# Patient Record
Sex: FEMALE | Race: BLACK OR AFRICAN AMERICAN | Employment: OTHER | URBAN - METROPOLITAN AREA
[De-identification: names, ages, dates, MRNs, and addresses within clinical notes are randomized per-mention and may not be internally consistent; named-entity substitution may affect disease eponyms.]

---

## 2017-01-03 RX ORDER — POTASSIUM CHLORIDE 750 MG/1
TABLET, EXTENDED RELEASE ORAL
Qty: 180 TAB | Refills: 0 | Status: ON HOLD | OUTPATIENT
Start: 2017-01-03 | End: 2018-07-02

## 2017-07-10 ENCOUNTER — HOSPITAL ENCOUNTER (EMERGENCY)
Age: 82
Discharge: HOME | End: 2017-07-10
Attending: EMERGENCY MEDICINE
Payer: MEDICARE

## 2017-07-10 VITALS
SYSTOLIC BLOOD PRESSURE: 145 MMHG | WEIGHT: 109 LBS | HEART RATE: 91 BPM | DIASTOLIC BLOOD PRESSURE: 72 MMHG | HEIGHT: 61 IN | OXYGEN SATURATION: 99 % | BODY MASS INDEX: 20.58 KG/M2 | TEMPERATURE: 98.2 F | RESPIRATION RATE: 18 BRPM

## 2017-07-10 DIAGNOSIS — L03.115 CELLULITIS OF RIGHT LOWER EXTREMITY: Primary | ICD-10-CM

## 2017-07-10 DIAGNOSIS — R60.9 EDEMA, UNSPECIFIED TYPE: ICD-10-CM

## 2017-07-10 LAB
ALBUMIN SERPL BCP-MCNC: 3.7 G/DL (ref 3.4–5)
ALBUMIN/GLOB SERPL: 0.8 {RATIO} (ref 0.8–1.7)
ALP SERPL-CCNC: 87 U/L (ref 45–117)
ALT SERPL-CCNC: 27 U/L (ref 13–56)
ANION GAP BLD CALC-SCNC: 9 MMOL/L (ref 3–18)
AST SERPL W P-5'-P-CCNC: 20 U/L (ref 15–37)
BASOPHILS # BLD AUTO: 0 K/UL (ref 0–0.1)
BASOPHILS # BLD: 0 % (ref 0–2)
BILIRUB SERPL-MCNC: 0.6 MG/DL (ref 0.2–1)
BUN SERPL-MCNC: 15 MG/DL (ref 7–18)
BUN/CREAT SERPL: 21 (ref 12–20)
CALCIUM SERPL-MCNC: 9.1 MG/DL (ref 8.5–10.1)
CHLORIDE SERPL-SCNC: 106 MMOL/L (ref 100–108)
CO2 SERPL-SCNC: 26 MMOL/L (ref 21–32)
CREAT SERPL-MCNC: 0.73 MG/DL (ref 0.6–1.3)
DIFFERENTIAL METHOD BLD: ABNORMAL
EOSINOPHIL # BLD: 0.1 K/UL (ref 0–0.4)
EOSINOPHIL NFR BLD: 1 % (ref 0–5)
ERYTHROCYTE [DISTWIDTH] IN BLOOD BY AUTOMATED COUNT: 15 % (ref 11.6–14.5)
GLOBULIN SER CALC-MCNC: 4.7 G/DL (ref 2–4)
GLUCOSE SERPL-MCNC: 93 MG/DL (ref 74–99)
HCT VFR BLD AUTO: 36.8 % (ref 35–45)
HGB BLD-MCNC: 12.4 G/DL (ref 12–16)
LYMPHOCYTES # BLD AUTO: 20 % (ref 21–52)
LYMPHOCYTES # BLD: 1.3 K/UL (ref 0.9–3.6)
MCH RBC QN AUTO: 28.4 PG (ref 24–34)
MCHC RBC AUTO-ENTMCNC: 33.7 G/DL (ref 31–37)
MCV RBC AUTO: 84.2 FL (ref 74–97)
MONOCYTES # BLD: 0.9 K/UL (ref 0.05–1.2)
MONOCYTES NFR BLD AUTO: 14 % (ref 3–10)
NEUTS SEG # BLD: 4.1 K/UL (ref 1.8–8)
NEUTS SEG NFR BLD AUTO: 65 % (ref 40–73)
PLATELET # BLD AUTO: 123 K/UL (ref 135–420)
PMV BLD AUTO: 11.3 FL (ref 9.2–11.8)
POTASSIUM SERPL-SCNC: 3.1 MMOL/L (ref 3.5–5.5)
PROT SERPL-MCNC: 8.4 G/DL (ref 6.4–8.2)
RBC # BLD AUTO: 4.37 M/UL (ref 4.2–5.3)
SODIUM SERPL-SCNC: 141 MMOL/L (ref 136–145)
WBC # BLD AUTO: 6.4 K/UL (ref 4.6–13.2)

## 2017-07-10 PROCEDURE — 80053 COMPREHEN METABOLIC PANEL: CPT | Performed by: EMERGENCY MEDICINE

## 2017-07-10 PROCEDURE — 85025 COMPLETE CBC W/AUTO DIFF WBC: CPT | Performed by: EMERGENCY MEDICINE

## 2017-07-10 PROCEDURE — 99282 EMERGENCY DEPT VISIT SF MDM: CPT

## 2017-07-10 PROCEDURE — 93971 EXTREMITY STUDY: CPT

## 2017-07-10 RX ORDER — CLINDAMYCIN HYDROCHLORIDE 300 MG/1
300 CAPSULE ORAL 3 TIMES DAILY
Qty: 21 CAP | Refills: 0 | Status: SHIPPED | OUTPATIENT
Start: 2017-07-10 | End: 2017-07-17

## 2017-07-10 NOTE — PROCEDURES
[facility]  *** FINAL REPORT ***    Name: Martin Maloney  MRN: [id number]  : 10 Apr 1927  HIS Order #: [id number]  [id number] [facility] Visit #: [id number]  Date: 10 Jul 2017    TYPE OF TEST: Peripheral Venous Testing    REASON FOR TEST  Pain in limb, Limb swelling    Right Leg:-  Deep venous thrombosis:           No  Superficial venous thrombosis:    No  Deep venous insufficiency:        Not examined  Superficial venous insufficiency: Not examined      INTERPRETATION/FINDINGS  Right leg :  Duplex images were obtained using 2-D gray scale, color flow, and  spectral Doppler analysis. 1. No evidence of deep venous thrombosis detected in the veins  visualized. 2. Deep veins visualized include the common femoral, femoral,  popliteal, posterior tibial and peroneal veins. 3. No evidence of superficial thrombosis detected. 4. Superficial veins visualized include the proximal great saphenous  vein. 5. Posterior tibial and anterior tibial arteries are both patent with  normal multiphasic flow. 6. No evidence of deep vein thrombosis in the contralateral common  femoral vein. ADDITIONAL COMMENTS    I have personally reviewed the data relevant to the interpretation of  this  study. TECHNOLOGIST: Deepa Bautista RVT  Signed: 07/10/2017 01:54 PM    PHYSICIAN: Edel Edwards D.O.   Signed: 07/10/2017 10:57 PM

## 2017-07-10 NOTE — ED PROVIDER NOTES
HPI Comments: 12:34 PM Portia Green is a 80 y.o. female with a h/o HTN, Hypothyroidism, DDD, Osteopenia, DJD, and Scoliosis, presents to the ED in the care of her daughter with complaints of right leg discoloration that has been gradually getting worse within the past couple months. However, she denies any leg pain, fever, nausea, vomiting, diarrhea, cough, SOB, chest pain, and/or rash. She denies recently traveling or any tobacco use. No further symptoms or complaints expressed at this time. Patient is a 80 y.o. female presenting with lower extremity edema. The history is provided by the patient and a relative. Leg Swelling   Pertinent negatives include no chest pain, no abdominal pain and no shortness of breath.         Past Medical History:   Diagnosis Date    DDD (degenerative disc disease), cervical 1-28-02    Diverticulosis 7-99    by BE    DJD (degenerative joint disease) of knee 12-29-03    right knee    Fatigue 5-14-04 5-7-04 TSH =1.7, 12-29-06 TSH=2.67, 5-2008 TSH=1.3    Glaucoma 12-99    cataract- s/p left CATS w/ IOL    Hypercholesterolemia     (LDL)    Hypertension     Hypothyroidism     post surg    Osteopenia 2-15-02    SPINE & HIP BY DXA,      VIT D =31 ()    Sacroiliac joint pain 9/2014    Scoliosis 9/2014    Shingles     left forehead    Vitamin D deficiency 8/19/2010       Past Surgical History:   Procedure Laterality Date    HX MOHS PROCEDURES  9-2004    LEFT    OR COLONOSCOPY FLX DX W/COLLJ SPEC WHEN PFRMD           Family History:   Problem Relation Age of Onset    Breast Cancer Mother     Hypertension Mother     Heart Attack Father     Hypertension Sister     Hypertension Brother     Stroke Brother     Hypertension Sister     Hypertension Brother     Heart Attack Brother     Breast Cancer Sister     Hypertension Sister     Thyroid Disease Daughter        Social History     Social History    Marital status:  Spouse name: N/A    Number of children: N/A    Years of education: N/A     Occupational History    Not on file. Social History Main Topics    Smoking status: Never Smoker    Smokeless tobacco: Never Used    Alcohol use No    Drug use: No    Sexual activity: Not on file     Other Topics Concern    Not on file     Social History Narrative         ALLERGIES: Review of patient's allergies indicates no known allergies. Review of Systems   Constitutional: Negative for chills, fatigue, fever and unexpected weight change. HENT: Negative for congestion and rhinorrhea. Respiratory: Negative for cough and shortness of breath. Cardiovascular: Negative for chest pain, palpitations and leg swelling. Gastrointestinal: Negative for abdominal pain, diarrhea, nausea and vomiting. Genitourinary: Negative for dysuria. Musculoskeletal: Negative for back pain and myalgias. Skin: Positive for color change (right leg). Negative for rash. Neurological: Negative for dizziness and weakness. Psychiatric/Behavioral: The patient is not nervous/anxious. All other systems reviewed and are negative. Vitals:    07/10/17 1050   BP: 145/72   Pulse: 91   Resp: 18   Temp: 98.2 °F (36.8 °C)   SpO2: 99%   Weight: 49.4 kg (109 lb)   Height: 5' 1\" (1.549 m)            Physical Exam   Constitutional: She is oriented to person, place, and time. She appears well-developed and well-nourished. No distress. HENT:   Head: Normocephalic and atraumatic. Right Ear: External ear normal.   Left Ear: External ear normal.   Nose: Nose normal.   Mouth/Throat: Oropharynx is clear and moist.   Eyes: Conjunctivae and EOM are normal. Pupils are equal, round, and reactive to light. No scleral icterus. Neck: Normal range of motion. Neck supple. No JVD present. No tracheal deviation present. No thyromegaly present. Cardiovascular: Normal rate, regular rhythm, normal heart sounds and intact distal pulses.   Exam reveals no gallop and no friction rub. No murmur heard. Pulmonary/Chest: Effort normal and breath sounds normal. She exhibits no tenderness. Abdominal: Soft. Bowel sounds are normal. She exhibits no distension. There is no tenderness. There is no rebound and no guarding. Musculoskeletal: Normal range of motion. She exhibits tenderness. She exhibits no edema. R LE with lateral nonblanching redness to the dorsum of the foot that stays below the knee, doppler flow noted on PT and DP could not be found, L LE with biphasic doppler flow, no redness, full strength in R LE    Lymphadenopathy:     She has no cervical adenopathy. Neurological: She is alert and oriented to person, place, and time. No cranial nerve deficit. Coordination normal.   No sensory loss, Gait normal, Motor 5/5   Skin: Skin is warm and dry. Psychiatric: She has a normal mood and affect. Her behavior is normal. Judgment and thought content normal.   Nursing note and vitals reviewed. MDM  Number of Diagnoses or Management Options  Diagnosis management comments: Pt is a 79 yo female with a hx of DJD, HTN presents with discoloration of the RLE with pain for the past several months with gradual onset. Pt daughter notes her leg was seen yesterday and had changed significantly with swelling and redness. Suspect cellulitis vs DVT vs ischemia. Will follow vascular studies then reevaluate.  Christa Cool DO 1:07 PM      ED Course       Procedures               Vitals:  Patient Vitals for the past 12 hrs:   Temp Pulse Resp BP SpO2   07/10/17 1050 98.2 °F (36.8 °C) 91 18 145/72 99 %       Medications ordered:   Medications - No data to display      Lab findings:  Recent Results (from the past 12 hour(s))   CBC WITH AUTOMATED DIFF    Collection Time: 07/10/17 11:39 AM   Result Value Ref Range    WBC 6.4 4.6 - 13.2 K/uL    RBC 4.37 4.20 - 5.30 M/uL    HGB 12.4 12.0 - 16.0 g/dL    HCT 36.8 35.0 - 45.0 %    MCV 84.2 74.0 - 97.0 FL    MCH 28.4 24.0 - 34.0 PG MCHC 33.7 31.0 - 37.0 g/dL    RDW 15.0 (H) 11.6 - 14.5 %    PLATELET 831 (L) 928 - 420 K/uL    MPV 11.3 9.2 - 11.8 FL    NEUTROPHILS 65 40 - 73 %    LYMPHOCYTES 20 (L) 21 - 52 %    MONOCYTES 14 (H) 3 - 10 %    EOSINOPHILS 1 0 - 5 %    BASOPHILS 0 0 - 2 %    ABS. NEUTROPHILS 4.1 1.8 - 8.0 K/UL    ABS. LYMPHOCYTES 1.3 0.9 - 3.6 K/UL    ABS. MONOCYTES 0.9 0.05 - 1.2 K/UL    ABS. EOSINOPHILS 0.1 0.0 - 0.4 K/UL    ABS. BASOPHILS 0.0 0.0 - 0.1 K/UL    DF AUTOMATED     METABOLIC PANEL, COMPREHENSIVE    Collection Time: 07/10/17 11:39 AM   Result Value Ref Range    Sodium 141 136 - 145 mmol/L    Potassium 3.1 (L) 3.5 - 5.5 mmol/L    Chloride 106 100 - 108 mmol/L    CO2 26 21 - 32 mmol/L    Anion gap 9 3.0 - 18 mmol/L    Glucose 93 74 - 99 mg/dL    BUN 15 7.0 - 18 MG/DL    Creatinine 0.73 0.6 - 1.3 MG/DL    BUN/Creatinine ratio 21 (H) 12 - 20      GFR est AA >60 >60 ml/min/1.73m2    GFR est non-AA >60 >60 ml/min/1.73m2    Calcium 9.1 8.5 - 10.1 MG/DL    Bilirubin, total 0.6 0.2 - 1.0 MG/DL    ALT (SGPT) 27 13 - 56 U/L    AST (SGOT) 20 15 - 37 U/L    Alk. phosphatase 87 45 - 117 U/L    Protein, total 8.4 (H) 6.4 - 8.2 g/dL    Albumin 3.7 3.4 - 5.0 g/dL    Globulin 4.7 (H) 2.0 - 4.0 g/dL    A-G Ratio 0.8 0.8 - 1.7           X-Ray, CT or other radiology findings or impressions:  DUPLEX LOWER EXT VENOUS RIGHT             Progress notes, Consult notes or additional Procedure notes:   Pt Duplex notes no DVT and good arterial flow so no need to have formal arterial studies. Will treat as cellulitis and proceed with close outpatient care. Massiel Aquino DO 2:44 PM    Reevaluation of patient:   I have reassessed the patient. Patient is feeling reassured. Disposition:  Diagnosis:   1. Cellulitis of right lower extremity    2.  Edema, unspecified type        Disposition: DC     Follow-up Information     Follow up With Details Comments Contact Info    Isaías Francis MD In 2 days  [address] [place] [address] [address]      [facility] EMERGENCY DEPT  As needed, If symptoms worsen [address] [id number]  [phone]           Patient's Medications   Start Taking    CLINDAMYCIN (CLEOCIN) 300 MG CAPSULE    Take 1 Cap by mouth three (3) times daily for 7 days. Continue Taking    ALPRAZOLAM (XANAX) 0.25 MG TABLET    Take 1 Tab by mouth nightly as needed for Anxiety. Max Daily Amount: 0.25 mg. AMLODIPINE (NORVASC) 5 MG TABLET    Take 1 Tab by mouth daily. ASPIRIN (ASPIRIN) 325 MG TABLET    Take 325 mg by mouth daily. CHOLECALCIFEROL, VITAMIN D3, (VITAMIN D) 2,000 UNIT CAP    Take 1 Tab by mouth daily. DORZOLAMIDE-TIMOLOL (COSOPT) 2-0.5 % OPHTHALMIC SOLUTION    Administer 1 Drop to right eye two (2) times a day. KLOR-CON M10 10 MEQ TABLET    TAKE ONE TABLET BY MOUTH TWICE DAILY    LEVOTHYROXINE (SYNTHROID) 88 MCG TABLET    Take 1 Tab by mouth Daily (before breakfast). LOSARTAN-HYDROCHLOROTHIAZIDE (HYZAAR) 100-12.5 MG PER TABLET    Take 1 Tab by mouth daily. OTHER    Compound cream    OXYCODONE-ACETAMINOPHEN (PERCOCET) 5-325 MG PER TABLET    Take 1 Tab by mouth every four (4) hours as needed for Pain. Max Daily Amount: 6 Tabs. PSYLLIUM SEED-SUCROSE (METAMUCIL) POWD    Take 1 Units by mouth two (2) times daily as needed. 1 tsp. in a glass of water b.i.d.prn    SIMVASTATIN (ZOCOR) 20 MG TABLET    Take  by mouth nightly. These Medications have changed    No medications on file   Stop Taking    No medications on file     Scribe Attestation:   I Brian Linares, am scribing for and in the presence of Tod Rene MD on this day 07/10/17 at 12:40 PM   Mayo Schwartz    Provider Attestation:  Personally performed the services described in the documentation, reviewed the documentation, as recorded by the scribe in my presence, and it accurately and completely records my words and actions.   Tod Rene MD. 12:40 PM    Signed by: Mayo Schwartz, 07/10/17 , 12:40 PM

## 2017-07-10 NOTE — ED NOTES
Patient to room via wheelchair, assisted into bed, blankets provided, call light in reach. Plan of care explained.

## 2017-07-10 NOTE — DISCHARGE INSTRUCTIONS
Cellulitis: Care Instructions  Your Care Instructions    Cellulitis is a skin infection. It often occurs after a break in the skin from a scrape, cut, bite, or puncture, or after a rash. The doctor has checked you carefully, but problems can develop later. If you notice any problems or new symptoms, get medical treatment right away. Follow-up care is a key part of your treatment and safety. Be sure to make and go to all appointments, and call your doctor if you are having problems. It's also a good idea to know your test results and keep a list of the medicines you take. How can you care for yourself at home? · Take your antibiotics as directed. Do not stop taking them just because you feel better. You need to take the full course of antibiotics. · Prop up the infected area on pillows to reduce pain and swelling. Try to keep the area above the level of your heart as often as you can. · If your doctor told you how to care for your wound, follow your doctor's instructions. If you did not get instructions, follow this general advice:  ¨ Wash the wound with clean water 2 times a day. Don't use hydrogen peroxide or alcohol, which can slow healing. ¨ You may cover the wound with a thin layer of petroleum jelly, such as Vaseline, and a nonstick bandage. ¨ Apply more petroleum jelly and replace the bandage as needed. · Be safe with medicines. Take pain medicines exactly as directed. ¨ If the doctor gave you a prescription medicine for pain, take it as prescribed. ¨ If you are not taking a prescription pain medicine, ask your doctor if you can take an over-the-counter medicine. To prevent cellulitis in the future  · Try to prevent cuts, scrapes, or other injuries to your skin. Cellulitis most often occurs where there is a break in the skin. · If you get a scrape, cut, mild burn, or bite, wash the wound with clean water as soon as you can to help avoid infection.  Don't use hydrogen peroxide or alcohol, which can slow healing. · If you have swelling in your legs (edema), support stockings and good skin care may help prevent leg sores and cellulitis. · Take care of your feet, especially if you have diabetes or other conditions that increase the risk of infection. Wear shoes and socks. Do not go barefoot. If you have athlete's foot or other skin problems on your feet, talk to your doctor about how to treat them. When should you call for help? Call your doctor now or seek immediate medical care if:  · You have signs that your infection is getting worse, such as:  ¨ Increased pain, swelling, warmth, or redness. ¨ Red streaks leading from the area. ¨ Pus draining from the area. ¨ A fever. · You get a rash. Watch closely for changes in your health, and be sure to contact your doctor if:  · You are not getting better after 1 day (24 hours). · You do not get better as expected. Where can you learn more? Go to http://nita-jamia.info/. Tony Nuno in the search box to learn more about \"Cellulitis: Care Instructions. \"  Current as of: October 13, 2016  Content Version: 11.3  © 8463-3891 Ruth [employer]stad[employer] â€“ [employer]. Care instructions adapted under license by [employer] (which disclaims liability or warranty for this information). If you have questions about a medical condition or this instruction, always ask your healthcare professional. [place] [address] any warranty or liability for your use of this information. Leg and Ankle Edema: Care Instructions  Your Care Instructions  Swelling in the legs, ankles, and feet is called edema. It is common after you sit or stand for a while. Long plane flights or car rides often cause swelling in the legs and feet. You may also have swelling if you have to stand for long periods of time at your job. Problems with the veins in the legs (varicose veins) and changes in hormones can also cause swelling.  Sometimes the swelling in the ankles and feet is caused by a more serious problem, such as heart failure, infection, blood clots, or liver or kidney disease. Follow-up care is a key part of your treatment and safety. Be sure to make and go to all appointments, and call your doctor if you are having problems. Its also a good idea to know your test results and keep a list of the medicines you take. How can you care for yourself at home? · If your doctor gave you medicine, take it as prescribed. Call your doctor if you think you are having a problem with your medicine. · Whenever you are resting, raise your legs up. Try to keep the swollen area higher than the level of your heart. · Take breaks from standing or sitting in one position. ¨ Walk around to increase the blood flow in your lower legs. ¨ Move your feet and ankles often while you stand, or tighten and relax your leg muscles. · Wear support stockings. Put them on in the morning, before swelling gets worse. · Eat a balanced diet. Lose weight if you need to. · Limit the amount of salt (sodium) in your diet. Salt holds fluid in the body and may increase swelling. When should you call for help? Call 911 anytime you think you may need emergency care. For example, call if:  · You have symptoms of a blood clot in your lung (called a pulmonary embolism). These may include:  ¨ Sudden chest pain. ¨ Trouble breathing. ¨ Coughing up blood. Call your doctor now or seek immediate medical care if:  · You have signs of a blood clot, such as:  ¨ Pain in your calf, back of the knee, thigh, or groin. ¨ Redness and swelling in your leg or groin. · You have symptoms of infection, such as:  ¨ Increased pain, swelling, warmth, or redness. ¨ Red streaks or pus. ¨ A fever. Watch closely for changes in your health, and be sure to contact your doctor if:  · Your swelling is getting worse. · You have new or worsening pain in your legs. · You do not get better as expected.   Where can you learn more?  Go to http://nita-jamia.info/. Enter U750 in the search box to learn more about \"Leg and Ankle Edema: Care Instructions. \"  Current as of: March 20, 2017  Content Version: 11.3  © 4836-3207 [employer]. Care instructions adapted under license by [employer] (which disclaims liability or warranty for this information). If you have questions about a medical condition or this instruction, always ask your healthcare professional. [place] [address] any warranty or liability for your use of this information.

## 2017-07-10 NOTE — ED TRIAGE NOTES
Patient in today for right lower leg swelling, and discoloration. Denies pain, and able to ambulate.

## 2017-07-10 NOTE — ED NOTES
Discharge instructions and prescription for Cleocin reviewed with patient to include possible side effects. Patient and family member verbalized understanding. Patient was then assisted to car via wheelchair.

## 2018-06-21 ENCOUNTER — APPOINTMENT (OUTPATIENT)
Dept: GENERAL RADIOLOGY | Age: 83
End: 2018-06-21
Attending: PHYSICIAN ASSISTANT
Payer: MEDICARE

## 2018-06-21 ENCOUNTER — HOSPITAL ENCOUNTER (EMERGENCY)
Age: 83
Discharge: HOME | End: 2018-06-21
Attending: EMERGENCY MEDICINE | Admitting: EMERGENCY MEDICINE
Payer: MEDICARE

## 2018-06-21 VITALS
SYSTOLIC BLOOD PRESSURE: 151 MMHG | TEMPERATURE: 98.4 F | HEIGHT: 64 IN | BODY MASS INDEX: 16.37 KG/M2 | WEIGHT: 95.9 LBS | RESPIRATION RATE: 17 BRPM | DIASTOLIC BLOOD PRESSURE: 58 MMHG | OXYGEN SATURATION: 98 % | HEART RATE: 73 BPM

## 2018-06-21 DIAGNOSIS — M25.00 HEMARTHROSIS: Primary | ICD-10-CM

## 2018-06-21 LAB
APPEARANCE FLD: ABNORMAL
BODY FLD TYPE: NORMAL
COLOR FLD: ABNORMAL
CRYSTALS FLD MICRO: NORMAL
EOSINOPHIL NFR FLD MANUAL: 0 %
GLUCOSE FLD-MCNC: 20 MG/DL
LYMPHOCYTES NFR FLD: 1 %
MACROPHAGES NFR FLD: 1 %
MONOCYTES NFR FLD: 13 %
NEUTROPHILS NFR FLD: 83 %
NEUTS BAND # FLD: 2 %
NUC CELL # FLD: ABNORMAL /CU MM
RBC # FLD: ABNORMAL /CU MM
SPECIMEN SOURCE FLD: ABNORMAL
SPECIMEN SOURCE FLD: NORMAL

## 2018-06-21 PROCEDURE — 75810000054 HC ARTHOCENTISIS JOINT

## 2018-06-21 PROCEDURE — 87070 CULTURE OTHR SPECIMN AEROBIC: CPT | Performed by: PHYSICIAN ASSISTANT

## 2018-06-21 PROCEDURE — 73562 X-RAY EXAM OF KNEE 3: CPT

## 2018-06-21 PROCEDURE — 87075 CULTR BACTERIA EXCEPT BLOOD: CPT | Performed by: PHYSICIAN ASSISTANT

## 2018-06-21 PROCEDURE — 89051 BODY FLUID CELL COUNT: CPT | Performed by: PHYSICIAN ASSISTANT

## 2018-06-21 PROCEDURE — 99285 EMERGENCY DEPT VISIT HI MDM: CPT

## 2018-06-21 PROCEDURE — 82945 GLUCOSE OTHER FLUID: CPT | Performed by: PHYSICIAN ASSISTANT

## 2018-06-21 PROCEDURE — 89060 EXAM SYNOVIAL FLUID CRYSTALS: CPT | Performed by: PHYSICIAN ASSISTANT

## 2018-06-21 RX ORDER — LIDOCAINE HYDROCHLORIDE 10 MG/ML
10 INJECTION, SOLUTION EPIDURAL; INFILTRATION; INTRACAUDAL; PERINEURAL ONCE
Status: DISCONTINUED | OUTPATIENT
Start: 2018-06-21 | End: 2018-06-21 | Stop reason: HOSPADM

## 2018-06-21 NOTE — ED PROVIDER NOTES
EMERGENCY DEPARTMENT HISTORY AND PHYSICAL EXAM    7:24 AM      Date: 6/21/2018  Patient Name: Sarah Hughes    History of Presenting Illness     Chief Complaint   Patient presents with    Knee Swelling         History Provided By: Patient    Chief Complaint: R knee pain, effusion, decreased ambulation  Duration:  Days  Timing:  Progressive  Location: R knee  Quality: Aching  Severity: Moderate  Modifying Factors: worse with movement  Associated Symptoms: denies any other associated signs or symptoms      Additional History (Context): Sarah Hughes is a 80 y.o. female with hx of HTN, hypothyroidism, osteopenia who presents with c/o progressive R knee effusion and pain x 5 days. Daughter denies injury/trauma, calf pain/swelling, fever/chills. Notes patient usually ambulates with walker but has been unable to due to the knee swelling. Notes \"it's been drained in the past and helped a lot\". Daughter notes patient has seen Dr. Dipesh Felipe, orthopedic, in the past.     PCP: Genora Landau, MD    Current Facility-Administered Medications   Medication Dose Route Frequency Provider Last Rate Last Dose    lidocaine (PF) (XYLOCAINE) 10 mg/mL (1 %) injection 10 mL  10 mL SubCUTAneous ONCE Lisset Lazo         Current Outpatient Prescriptions   Medication Sig Dispense Refill    KLOR-CON M10 10 mEq tablet TAKE ONE TABLET BY MOUTH TWICE DAILY 180 Tab 0    losartan-hydrochlorothiazide (HYZAAR) 100-12.5 mg per tablet Take 1 Tab by mouth daily. 90 Tab 3    oxyCODONE-acetaminophen (PERCOCET) 5-325 mg per tablet Take 1 Tab by mouth every four (4) hours as needed for Pain. Max Daily Amount: 6 Tabs. 60 Tab 0    amLODIPine (NORVASC) 5 mg tablet Take 1 Tab by mouth daily. 90 Tab 1    OTHER Compound cream  5    ALPRAZolam (XANAX) 0.25 mg tablet Take 1 Tab by mouth nightly as needed for Anxiety. Max Daily Amount: 0.25 mg. 30 Tab 1    levothyroxine (SYNTHROID) 88 mcg tablet Take 1 Tab by mouth Daily (before breakfast).  90 Tab 3    simvastatin (ZOCOR) 20 mg tablet Take  by mouth nightly.  aspirin (ASPIRIN) 325 mg tablet Take 325 mg by mouth daily.  psyllium seed-sucrose (METAMUCIL) Powd Take 1 Units by mouth two (2) times daily as needed. 1 tsp. in a glass of water b.i.d.prn 2 Can 3    Cholecalciferol, Vitamin D3, (VITAMIN D) 2,000 unit Cap Take 1 Tab by mouth daily.  dorzolamide-timolol (COSOPT) 2-0.5 % ophthalmic solution Administer 1 Drop to right eye two (2) times a day.          Past History     Past Medical History:  Past Medical History:   Diagnosis Date    DDD (degenerative disc disease), cervical 1-28-02    Diverticulosis 7-99    by BE    DJD (degenerative joint disease) of knee 12-29-03    right knee    Fatigue 5-14-04 5-7-04 TSH =1.7, 12-29-06 TSH=2.67, 5-2008 TSH=1.3    Glaucoma 12-99    cataract- s/p left CATS w/ IOL    Hypercholesterolemia     (LDL)    Hypertension     Hypothyroidism     post surg    Osteopenia 2-15-02    SPINE & HIP BY DXA,      VIT D =31 ()    Sacroiliac joint pain 9/2014    Scoliosis 9/2014    Shingles     left forehead    Vitamin D deficiency 8/19/2010       Past Surgical History:  Past Surgical History:   Procedure Laterality Date    HX MOHS PROCEDURES  9-2004    LEFT    [facility] COLONOSCOPY FLX DX W/COLLJ SPEC WHEN PFRMD         Family History:  Family History   Problem Relation Age of Onset    Breast Cancer Mother     Hypertension Mother     Heart Attack Father     Hypertension Sister     Hypertension Brother     Stroke Brother     Hypertension Sister     Hypertension Brother     Heart Attack Brother     Breast Cancer Sister     Hypertension Sister     Thyroid Disease Daughter        Social History:  Social History   Substance Use Topics    Smoking status: Never Smoker    Smokeless tobacco: Never Used    Alcohol use No       Allergies:  No Known Allergies      Review of Systems       Review of Systems   Constitutional: Negative for chills and fever. Respiratory: Negative for shortness of breath. Cardiovascular: Negative for chest pain. Gastrointestinal: Negative for abdominal pain, nausea and vomiting. Musculoskeletal: Positive for gait problem (ambulates with walker at baseline), joint swelling and myalgias. Skin: Negative for rash. Neurological: Negative for weakness. All other systems reviewed and are negative. Physical Exam     Visit Vitals    /73    Pulse 75    Temp 98.4 °F (36.9 °C)    Resp 11    Ht 5' 4\" (1.626 m)    Wt 43.5 kg (95 lb 14.4 oz)    SpO2 100%    BMI 16.46 kg/m2         Physical Exam   Constitutional: She appears well-developed and well-nourished. No distress. HENT:   Head: Normocephalic and atraumatic. Dry MM     Neck: Normal range of motion. Neck supple. Cardiovascular: Normal rate, regular rhythm, normal heart sounds and intact distal pulses. Exam reveals no gallop and no friction rub. No murmur heard. Pulmonary/Chest: Effort normal and breath sounds normal. No respiratory distress. She has no wheezes. She has no rales. Musculoskeletal:        Right knee: She exhibits decreased range of motion (flexion of knee) and effusion (moderate suprapatellar effusion). She exhibits no erythema (or warmth). Tenderness found. Chronic hyperpigmentation of b/l LE from mid-shaft of shin to feet without overlying erythema or edema, skin intact without abrasions   Neurological: She is alert. Skin: Skin is warm. No rash noted. She is not diaphoretic. Nursing note and vitals reviewed. Diagnostic Study Results     Labs -  No results found for this or any previous visit (from the past 12 hour(s)). Radiologic Studies -   XR KNEE RT 3 V   Final Result        IMPRESSION:     Osteoarthritis and chondrocalcinosis in right knee joint as described.     No evidence of fracture or dislocation at right knee.     At least small effusion in the suprapatellar bursal right knee.     Medical Decision Making   I am the first provider for this patient. I reviewed the vital signs, available nursing notes, past medical history, past surgical history, family history and social history. Vital Signs-Reviewed the patient's vital signs. Pulse Oximetry Analysis -  98 on room air     Records Reviewed: Nursing Notes and Old Medical Records (Time of Review: 7:24 AM)    ED Course: Progress Notes, Reevaluation, and Consults:  7:58 AM: Verbal and written consent obtained from patients' daughter and patient. 8:30 AM: Procedure completed with Dr. Tisha Ko. Pt notes \"I hit my knee against the bar stool this week\". 9:30 AM Reviewed results with patient and family. Discussed need for close outpatient follow-up. Discussed strict return precautions, including fever, increased swelling, or any other medical concerns. Discussed importance of elevation, compression, and rest.     Provider Notes (Medical Decision Making): 81 yo F who presents due to R knee effusion and pain x days. Afebrile, VS stable, looks well. X-ray without evidence of fracture. No overlying erythema or warmth of joint. Extremity intact. Arthrocentesis performed without complication. 80cc of bloody aspirate sent for culture and stain. Ace wrap applied. Has walker at home. Will follow-up with orthopedic as outpatient. Procedures: ARTHROCENTESIS MAJOR JOINT  Date/Time: 6/21/2018 10:12 AM  Performed by: Tracy Mccoy  Authorized by: Tracy Mccoy     Consent:     Consent obtained:  Written and verbal    Consent given by:  Patient (daughter)    Risks discussed:  Bleeding, infection, pain and incomplete drainage    Alternatives discussed:  Delayed treatment  Location:     Location:  Knee    Knee:  R knee  Anesthesia (see MAR for exact dosages):      Anesthesia method:  Local infiltration    Local anesthetic:  Lidocaine 1% w/o epi  Procedure details:     Preparation: Patient was prepped and draped in usual sterile fashion      Needle gauge: 25 G    Ultrasound guidance: no      Approach:  Lateral    Aspirate amount:  80    Aspirate characteristics:  Bloody    Steroid injected: no      Specimen collected: yes    Post-procedure details:     Dressing:  Adhesive bandage (ace wrap)    Patient tolerance of procedure: Tolerated well, no immediate complications  Comments:      Has walker at home         Diagnosis     Clinical Impression:   1. Hemarthrosis        Disposition: home     Follow-up Information     Follow up With Details Comments Contact Info    [facility] EMERGENCY DEPT  If symptoms worsen [address] [place], [place] In 2 days  [address] [address] [address]      Dieudonne Ayoub MD Schedule an appointment as soon as possible for a visit orthopedic [address] [phone]             Patient's Medications   Start Taking    No medications on file   Continue Taking    ALPRAZOLAM (XANAX) 0.25 MG TABLET    Take 1 Tab by mouth nightly as needed for Anxiety. Max Daily Amount: 0.25 mg. AMLODIPINE (NORVASC) 5 MG TABLET    Take 1 Tab by mouth daily. ASPIRIN (ASPIRIN) 325 MG TABLET    Take 325 mg by mouth daily. CHOLECALCIFEROL, VITAMIN D3, (VITAMIN D) 2,000 UNIT CAP    Take 1 Tab by mouth daily. DORZOLAMIDE-TIMOLOL (COSOPT) 2-0.5 % OPHTHALMIC SOLUTION    Administer 1 Drop to right eye two (2) times a day. KLOR-CON M10 10 MEQ TABLET    TAKE ONE TABLET BY MOUTH TWICE DAILY    LEVOTHYROXINE (SYNTHROID) 88 MCG TABLET    Take 1 Tab by mouth Daily (before breakfast). LOSARTAN-HYDROCHLOROTHIAZIDE (HYZAAR) 100-12.5 MG PER TABLET    Take 1 Tab by mouth daily. OTHER    Compound cream    OXYCODONE-ACETAMINOPHEN (PERCOCET) 5-325 MG PER TABLET    Take 1 Tab by mouth every four (4) hours as needed for Pain. Max Daily Amount: 6 Tabs.     PSYLLIUM SEED-SUCROSE (METAMUCIL) POWD    Take 1 Units by mouth two (2) times daily as needed. 1 tsp. in a glass of water b.i.d.prn    SIMVASTATIN (ZOCOR) 20 MG TABLET    Take  by mouth nightly.    These Medications have changed    No medications on file   Stop Taking    No medications on file

## 2018-06-21 NOTE — ED NOTES
Awake and alert on stretcher. Denies any increased pain at this time. Daughter at bedside. Planned aspiration of right knee.

## 2018-06-21 NOTE — ED TRIAGE NOTES
Patient to room 16. Daughter at bedside and is patient's caregiver. Daughter stated she fell 3 days ago while using the toilet, landing on right knee. Patient denies pain. Knee swollen with no abrasions or open areas noted.

## 2018-06-26 LAB
BACTERIA SPEC CULT: NORMAL
BACTERIA SPEC CULT: NORMAL
GRAM STN SPEC: NORMAL
GRAM STN SPEC: NORMAL
SERVICE CMNT-IMP: NORMAL
SERVICE CMNT-IMP: NORMAL

## 2018-07-01 ENCOUNTER — APPOINTMENT (OUTPATIENT)
Dept: GENERAL RADIOLOGY | Age: 83
End: 2018-07-01
Attending: EMERGENCY MEDICINE
Payer: MEDICARE

## 2018-07-01 ENCOUNTER — APPOINTMENT (OUTPATIENT)
Dept: CT IMAGING | Age: 83
End: 2018-07-01
Attending: EMERGENCY MEDICINE
Payer: MEDICARE

## 2018-07-01 ENCOUNTER — HOSPITAL ENCOUNTER (OUTPATIENT)
Age: 83
Setting detail: OBSERVATION
Discharge: HOME | End: 2018-07-02
Attending: EMERGENCY MEDICINE | Admitting: INTERNAL MEDICINE
Payer: MEDICARE

## 2018-07-01 ENCOUNTER — APPOINTMENT (OUTPATIENT)
Dept: MRI IMAGING | Age: 83
End: 2018-07-01
Attending: INTERNAL MEDICINE
Payer: MEDICARE

## 2018-07-01 DIAGNOSIS — G45.9 TRANSIENT CEREBRAL ISCHEMIA, UNSPECIFIED TYPE: Primary | ICD-10-CM

## 2018-07-01 DIAGNOSIS — E87.6 HYPOKALEMIA: ICD-10-CM

## 2018-07-01 LAB
ABO + RH BLD: NORMAL
ALBUMIN SERPL-MCNC: 2.7 G/DL (ref 3.4–5)
ALBUMIN/GLOB SERPL: 0.6 {RATIO} (ref 0.8–1.7)
ALP SERPL-CCNC: 85 U/L (ref 45–117)
ALT SERPL-CCNC: 11 U/L (ref 13–56)
ANION GAP SERPL CALC-SCNC: 12 MMOL/L (ref 3–18)
APTT PPP: 36.4 SEC (ref 23–36.4)
AST SERPL-CCNC: 10 U/L (ref 15–37)
ATRIAL RATE: 300 BPM
BASOPHILS # BLD: 0 K/UL (ref 0–0.06)
BASOPHILS NFR BLD: 0 % (ref 0–3)
BILIRUB SERPL-MCNC: 0.8 MG/DL (ref 0.2–1)
BLOOD GROUP ANTIBODIES SERPL: NORMAL
BUN SERPL-MCNC: 15 MG/DL (ref 7–18)
BUN/CREAT SERPL: 14 (ref 12–20)
CALCIUM SERPL-MCNC: 8.7 MG/DL (ref 8.5–10.1)
CALCULATED P AXIS, ECG09: 66 DEGREES
CALCULATED R AXIS, ECG10: 15 DEGREES
CALCULATED T AXIS, ECG11: 57 DEGREES
CHLORIDE SERPL-SCNC: 98 MMOL/L (ref 100–108)
CK MB CFR SERPL CALC: NORMAL % (ref 0–4)
CK MB SERPL-MCNC: <1 NG/ML (ref 5–25)
CK SERPL-CCNC: 67 U/L (ref 26–192)
CO2 SERPL-SCNC: 27 MMOL/L (ref 21–32)
CREAT SERPL-MCNC: 1.06 MG/DL (ref 0.6–1.3)
DIAGNOSIS, 93000: NORMAL
DIFFERENTIAL METHOD BLD: ABNORMAL
EOSINOPHIL # BLD: 0 K/UL (ref 0–0.4)
EOSINOPHIL NFR BLD: 0 % (ref 0–5)
ERYTHROCYTE [DISTWIDTH] IN BLOOD BY AUTOMATED COUNT: 15.5 % (ref 11.6–14.5)
GLOBULIN SER CALC-MCNC: 4.9 G/DL (ref 2–4)
GLUCOSE BLD STRIP.AUTO-MCNC: 82 MG/DL (ref 70–110)
GLUCOSE BLD STRIP.AUTO-MCNC: 92 MG/DL (ref 70–110)
GLUCOSE SERPL-MCNC: 142 MG/DL (ref 74–99)
HBA1C MFR BLD: 5.1 % (ref 4.2–5.6)
HCT VFR BLD AUTO: 32.2 % (ref 35–45)
HGB BLD-MCNC: 10.5 G/DL (ref 12–16)
INR PPP: 1.1 (ref 0.8–1.2)
LYMPHOCYTES # BLD: 1 K/UL (ref 0.8–3.5)
LYMPHOCYTES NFR BLD: 18 % (ref 20–51)
MCH RBC QN AUTO: 27.8 PG (ref 24–34)
MCHC RBC AUTO-ENTMCNC: 32.6 G/DL (ref 31–37)
MCV RBC AUTO: 85.2 FL (ref 74–97)
MONOCYTES # BLD: 0.7 K/UL (ref 0–1)
MONOCYTES NFR BLD: 13 % (ref 2–9)
NEUTS BAND NFR BLD MANUAL: 8 % (ref 0–5)
NEUTS SEG # BLD: 3.7 K/UL (ref 1.8–8)
NEUTS SEG NFR BLD: 61 % (ref 42–75)
PLATELET # BLD AUTO: 124 K/UL (ref 135–420)
PLATELET COMMENTS,PCOM: ABNORMAL
POTASSIUM SERPL-SCNC: 3.1 MMOL/L (ref 3.5–5.5)
PROT SERPL-MCNC: 7.6 G/DL (ref 6.4–8.2)
PROTHROMBIN TIME: 14.1 SEC (ref 11.5–15.2)
Q-T INTERVAL, ECG07: 380 MS
QRS DURATION, ECG06: 70 MS
QTC CALCULATION (BEZET), ECG08: 433 MS
RBC # BLD AUTO: 3.78 M/UL (ref 4.2–5.3)
RBC MORPH BLD: ABNORMAL
SODIUM SERPL-SCNC: 137 MMOL/L (ref 136–145)
SPECIMEN EXP DATE BLD: NORMAL
TROPONIN I SERPL-MCNC: <0.02 NG/ML (ref 0–0.04)
VENTRICULAR RATE, ECG03: 78 BPM
WBC # BLD AUTO: 5.4 K/UL (ref 4.6–13.2)

## 2018-07-01 PROCEDURE — 36415 COLL VENOUS BLD VENIPUNCTURE: CPT | Performed by: EMERGENCY MEDICINE

## 2018-07-01 PROCEDURE — 74011250637 HC RX REV CODE- 250/637: Performed by: EMERGENCY MEDICINE

## 2018-07-01 PROCEDURE — 93005 ELECTROCARDIOGRAM TRACING: CPT

## 2018-07-01 PROCEDURE — 86900 BLOOD TYPING SEROLOGIC ABO: CPT | Performed by: EMERGENCY MEDICINE

## 2018-07-01 PROCEDURE — 70551 MRI BRAIN STEM W/O DYE: CPT

## 2018-07-01 PROCEDURE — 74011000250 HC RX REV CODE- 250: Performed by: INTERNAL MEDICINE

## 2018-07-01 PROCEDURE — 70450 CT HEAD/BRAIN W/O DYE: CPT

## 2018-07-01 PROCEDURE — 99218 HC RM OBSERVATION: CPT

## 2018-07-01 PROCEDURE — 85025 COMPLETE CBC W/AUTO DIFF WBC: CPT | Performed by: EMERGENCY MEDICINE

## 2018-07-01 PROCEDURE — 65390000012 HC CONDITION CODE 44 OBSERVATION

## 2018-07-01 PROCEDURE — 85730 THROMBOPLASTIN TIME PARTIAL: CPT | Performed by: EMERGENCY MEDICINE

## 2018-07-01 PROCEDURE — 82550 ASSAY OF CK (CPK): CPT | Performed by: EMERGENCY MEDICINE

## 2018-07-01 PROCEDURE — 74011250636 HC RX REV CODE- 250/636: Performed by: EMERGENCY MEDICINE

## 2018-07-01 PROCEDURE — 83036 HEMOGLOBIN GLYCOSYLATED A1C: CPT | Performed by: EMERGENCY MEDICINE

## 2018-07-01 PROCEDURE — 96372 THER/PROPH/DIAG INJ SC/IM: CPT

## 2018-07-01 PROCEDURE — 71045 X-RAY EXAM CHEST 1 VIEW: CPT

## 2018-07-01 PROCEDURE — 99285 EMERGENCY DEPT VISIT HI MDM: CPT

## 2018-07-01 PROCEDURE — 74011250636 HC RX REV CODE- 250/636: Performed by: INTERNAL MEDICINE

## 2018-07-01 PROCEDURE — 74011250637 HC RX REV CODE- 250/637: Performed by: INTERNAL MEDICINE

## 2018-07-01 PROCEDURE — 80053 COMPREHEN METABOLIC PANEL: CPT | Performed by: EMERGENCY MEDICINE

## 2018-07-01 PROCEDURE — 85610 PROTHROMBIN TIME: CPT | Performed by: EMERGENCY MEDICINE

## 2018-07-01 PROCEDURE — 82962 GLUCOSE BLOOD TEST: CPT

## 2018-07-01 RX ORDER — SODIUM CHLORIDE 0.9 % (FLUSH) 0.9 %
5-10 SYRINGE (ML) INJECTION EVERY 8 HOURS
Status: DISCONTINUED | OUTPATIENT
Start: 2018-07-01 | End: 2018-07-02 | Stop reason: HOSPADM

## 2018-07-01 RX ORDER — ATORVASTATIN CALCIUM 40 MG/1
80 TABLET, FILM COATED ORAL
Status: DISCONTINUED | OUTPATIENT
Start: 2018-07-01 | End: 2018-07-01

## 2018-07-01 RX ORDER — GUAIFENESIN 100 MG/5ML
81 LIQUID (ML) ORAL
Status: COMPLETED | OUTPATIENT
Start: 2018-07-01 | End: 2018-07-01

## 2018-07-01 RX ORDER — DORZOLAMIDE HYDROCHLORIDE AND TIMOLOL MALEATE 20; 5 MG/ML; MG/ML
1 SOLUTION/ DROPS OPHTHALMIC 2 TIMES DAILY
Status: DISCONTINUED | OUTPATIENT
Start: 2018-07-01 | End: 2018-07-02 | Stop reason: HOSPADM

## 2018-07-01 RX ORDER — LEVOTHYROXINE SODIUM 88 UG/1
88 TABLET ORAL
Status: DISCONTINUED | OUTPATIENT
Start: 2018-07-02 | End: 2018-07-02 | Stop reason: HOSPADM

## 2018-07-01 RX ORDER — AMLODIPINE BESYLATE 5 MG/1
5 TABLET ORAL DAILY
Status: DISCONTINUED | OUTPATIENT
Start: 2018-07-02 | End: 2018-07-01

## 2018-07-01 RX ORDER — ACETAMINOPHEN 325 MG/1
650 TABLET ORAL
Status: DISCONTINUED | OUTPATIENT
Start: 2018-07-01 | End: 2018-07-02 | Stop reason: HOSPADM

## 2018-07-01 RX ORDER — LORAZEPAM 1 MG/1
1 TABLET ORAL
COMMUNITY

## 2018-07-01 RX ORDER — SIMVASTATIN 20 MG/1
20 TABLET, FILM COATED ORAL
Status: DISCONTINUED | OUTPATIENT
Start: 2018-07-01 | End: 2018-07-02 | Stop reason: HOSPADM

## 2018-07-01 RX ORDER — ASPIRIN 325 MG
325 TABLET ORAL DAILY
Status: DISCONTINUED | OUTPATIENT
Start: 2018-07-02 | End: 2018-07-02 | Stop reason: HOSPADM

## 2018-07-01 RX ORDER — SODIUM CHLORIDE 9 MG/ML
100 INJECTION, SOLUTION INTRAVENOUS CONTINUOUS
Status: DISCONTINUED | OUTPATIENT
Start: 2018-07-01 | End: 2018-07-02 | Stop reason: HOSPADM

## 2018-07-01 RX ORDER — HEPARIN SODIUM 5000 [USP'U]/ML
5000 INJECTION, SOLUTION INTRAVENOUS; SUBCUTANEOUS EVERY 8 HOURS
Status: DISCONTINUED | OUTPATIENT
Start: 2018-07-01 | End: 2018-07-02 | Stop reason: HOSPADM

## 2018-07-01 RX ORDER — SODIUM CHLORIDE 0.9 % (FLUSH) 0.9 %
5-10 SYRINGE (ML) INJECTION AS NEEDED
Status: DISCONTINUED | OUTPATIENT
Start: 2018-07-01 | End: 2018-07-02 | Stop reason: HOSPADM

## 2018-07-01 RX ORDER — LOSARTAN POTASSIUM AND HYDROCHLOROTHIAZIDE 12.5; 1 MG/1; MG/1
1 TABLET ORAL DAILY
Status: DISCONTINUED | OUTPATIENT
Start: 2018-07-02 | End: 2018-07-01

## 2018-07-01 RX ADMIN — Medication 10 ML: at 19:08

## 2018-07-01 RX ADMIN — HEPARIN SODIUM 5000 UNITS: 5000 INJECTION, SOLUTION INTRAVENOUS; SUBCUTANEOUS at 19:06

## 2018-07-01 RX ADMIN — HEPARIN SODIUM 5000 UNITS: 5000 INJECTION, SOLUTION INTRAVENOUS; SUBCUTANEOUS at 22:11

## 2018-07-01 RX ADMIN — ASPIRIN 81 MG CHEWABLE TABLET 81 MG: 81 TABLET CHEWABLE at 13:12

## 2018-07-01 RX ADMIN — SIMVASTATIN 20 MG: 20 TABLET, FILM COATED ORAL at 22:11

## 2018-07-01 RX ADMIN — SODIUM CHLORIDE 100 ML/HR: 900 INJECTION, SOLUTION INTRAVENOUS at 12:08

## 2018-07-01 RX ADMIN — ATORVASTATIN CALCIUM 80 MG: 40 TABLET, FILM COATED ORAL at 13:12

## 2018-07-01 NOTE — IP AVS SNAPSHOT
[facility] 
 
 
 [address] Patient: Ary Navarro MRN: [id number] :4/10/1927 About your hospitalization You were admitted on:  2018 You last received care in the:  [facility] [address] You were discharged on:  2018 Why you were hospitalized Your primary diagnosis was:  Not on File Your diagnoses also included:  Tia (Transient Ischemic Attack) Follow-up Information Follow up With Details Comments Contact Info Wilian Mcclendon MD  Unable to reach office at this time for a follow up. Attempted 3 times and left a voicemail. [address] [id number] [phone] Discharge Orders None A check mynor indicates which time of day the medication should be taken. My Medications CHANGE how you take these medications Instructions Each Dose to Equal  
 Morning Noon Evening Bedtime OTHER What changed:  additional instructions Your last dose was: Your next dose is:    
   
   
 Cbc  BMP  Magnesium  - send results to Sujata Swenson 100 patient's PCP potassium chloride 10 mEq tablet Commonly known as:  KLOR-CON M10 What changed:  See the new instructions. Your last dose was: Your next dose is: Take 2 Tabs by mouth daily. Indications: hypokalemia 20 mEq CONTINUE taking these medications Instructions Each Dose to Equal  
 Morning Noon Evening Bedtime ALPRAZolam 0.25 mg tablet Commonly known as:  Dorie Mc Your last dose was: Your next dose is: Take 1 Tab by mouth nightly as needed for Anxiety. Max Daily Amount: 0.25 mg.  
 0.25 mg  
    
   
   
   
  
 amLODIPine 5 mg tablet Commonly known as:  Ro Rodriguez Your last dose was: Your next dose is: Take 1 Tab by mouth daily. 5 mg  
    
   
   
   
  
 aspirin 325 mg tablet Commonly known as:  ASPIRIN Your last dose was: Your next dose is: Take 325 mg by mouth daily. 325 mg  
    
   
   
   
  
 ATIVAN 1 mg tablet Generic drug:  LORazepam  
   
Your last dose was: Your next dose is: Take 1 mg by mouth every six (6) hours as needed for Anxiety. 1 mg COSOPT 22.3-6.8 mg/mL ophthalmic solution Generic drug:  dorzolamide-timolol Your last dose was: Your next dose is:    
   
   
 Administer 1 Drop to right eye two (2) times a day. 1 Drop  
    
   
   
   
  
 levothyroxine 88 mcg tablet Commonly known as:  SYNTHROID Your last dose was: Your next dose is: Take 1 Tab by mouth Daily (before breakfast). 88 mcg  
    
   
   
   
  
 losartan-hydroCHLOROthiazide 100-12.5 mg per tablet Commonly known as:  HYZAAR Your last dose was: Your next dose is: Take 1 Tab by mouth daily. 1 Tab  
    
   
   
   
  
 psyllium seed-sucrose Powd Commonly known as:  METAMUCIL (SUGAR) Your last dose was: Your next dose is: Take 1 Units by mouth two (2) times daily as needed. 1 tsp. in a glass of water b.i.d.prn  
 1 Units VITAMIN D 2,000 unit Cap capsule Generic drug:  Cholecalciferol (Vitamin D3) Your last dose was: Your next dose is: Take 1 Tab by mouth daily. 1 Tab Charlaine Latin Your last dose was: Your next dose is:    
   
   
 1 Units by Does Not Apply route daily. Walker with seat  
 1 Units ZOCOR 20 mg tablet Generic drug:  simvastatin Your last dose was: Your next dose is: Take  by mouth nightly. STOP taking these medications oxyCODONE-acetaminophen 5-325 mg per tablet Commonly known as:  PERCOCET Where to Get Your Medications Information on where to get these meds will be given to you by the nurse or doctor. ! Ask your nurse or doctor about these medications OTHER  
 potassium chloride 10 mEq tablet Discharge Instructions Patient armband removed and shredded MyChart Activation Thank you for requesting access to Feedjit. Please follow the instructions below to securely access and download your online medical record. Feedjit allows you to send messages to your doctor, view your test results, renew your prescriptions, schedule appointments, and more. How Do I Sign Up? 1. In your internet browser, go to [email or web address] 
2. Click on the First Time User? Click Here link in the Sign In box. You will be redirect to the New Member Sign Up page. 3. Enter your Feedjit Access Code exactly as it appears below. You will not need to use this code after youve completed the sign-up process. If you do not sign up before the expiration date, you must request a new code. Feedjit Access Code: [email or web address] Expires: 2018  7:35 AM (This is the date your Feedjit access code will ) 4. Enter the last four digits of your Social Security Number (xxxx) and Date of Birth (mm/dd/yyyy) as indicated and click Submit. You will be taken to the next sign-up page. 5. Create a Feedjit ID. This will be your Feedjit login ID and cannot be changed, so think of one that is secure and easy to remember. 6. Create a Feedjit password. You can change your password at any time. 7. Enter your Password Reset Question and Answer. This can be used at a later time if you forget your password. 8. Enter your e-mail address. You will receive e-mail notification when new information is available in [address] 9. Click Sign Up. You can now view and download portions of your medical record. 10. Click the Download Summary menu link to download a portable copy of your medical information. Additional Information If you have questions, please visit the Frequently Asked Questions section of the [employer] website at [email or web address] Remember, MuscleGenest is NOT to be used for urgent needs. For medical emergencies, dial 911. DISCHARGE SUMMARY from Nurse PATIENT INSTRUCTIONS: 
 
 
F-face looks uneven A-arms unable to move or move unevenly S-speech slurred or non-existent T-time-call 911 as soon as signs and symptoms begin-DO NOT go Back to bed or wait to see if you get better-TIME IS BRAIN. Warning Signs of HEART ATTACK Call 911 if you have these symptoms: 
? Chest discomfort. Most heart attacks involve discomfort in the center of the chest that lasts more than a few minutes, or that goes away and comes back. It can feel like uncomfortable pressure, squeezing, fullness, or pain. ? Discomfort in other areas of the upper body. Symptoms can include pain or discomfort in one or both arms, the back, neck, jaw, or stomach. ? Shortness of breath with or without chest discomfort. ? Other signs may include breaking out in a cold sweat, nausea, or lightheadedness. Don't wait more than five minutes to call [address]! Fast action can save your life. Calling 911 is almost always the fastest way to get lifesaving treatment. Emergency Medical Services staff can begin treatment when they arrive  up to an hour sooner than if someone gets to the hospital by car. The discharge information has been reviewed with the patient and guardian Transient Ischemic Attack: Care Instructions Your Care Instructions A transient ischemic attack (TIA) is when blood flow to a part of your brain is blocked for a short time. A TIA is like a stroke but usually lasts only a few minutes. A TIA does not cause lasting brain damage. Any vision problems, slurred speech, or other symptoms usually go away in 10 to 20 minutes. But they may last for up to 24 hours. TIAs are often warning signs of a stroke. Some people who have a TIA may have a stroke in the future. A stroke can cause symptoms like those of a TIA. But a stroke causes lasting damage to your brain. You can take steps to help prevent a stroke. One thing you can do is get early treatment. If you have other new symptoms, or if your symptoms do not get better, go back to the emergency room or call your doctor right away. Getting treatment right away may prevent long-term brain damage caused by a stroke. The doctor has checked you carefully, but problems can develop later. If you notice any problems or new symptoms, get medical treatment right away. Follow-up care is a key part of your treatment and safety. Be sure to make and go to all appointments, and call your doctor if you are having problems. It's also a good idea to know your test results and keep a list of the medicines you take. How can you care for yourself at home? Medicines ? · Be safe with medicines. Take your medicines exactly as prescribed. Call your doctor if you think you are having a problem with your medicine. ? · If you take a blood thinner, such as aspirin, be sure you get instructions about how to take your medicine safely. Blood thinners can cause serious bleeding problems. ? · Call your doctor if you are not able to take your medicines for any reason.   
? · Do not take any over-the-counter medicines or herbal products without talking to your doctor first.  
? · If you take birth control pills or hormone therapy, talk to your doctor. Ask if these treatments are right for you. ? Lifestyle changes ? · Do not smoke. If you need help quitting, talk to your doctor about stop-smoking programs and medicines. ? · Be active. If your doctor recommends it, get more exercise. Walking is a good choice. Bit by bit, increase the amount you walk every day. Try for at least 30 minutes on most days of the week. You also may want to swim, bike, or do other activities. ? · Eat heart-healthy foods. These include fruits, vegetables, high-fiber foods, fish, and foods that are low in sodium, saturated fat, and trans fat. ? · Stay at a healthy weight. Lose weight if you need to.  
? · Limit alcohol to 2 drinks a day for men and 1 drink a day for women. ?Staying healthy ? · Manage other health problems such as diabetes, high blood pressure, and high cholesterol. ? · Get the flu vaccine every year. When should you call for help? Call 911 anytime you think you may need emergency care. For example, call if: 
? · You have new or worse symptoms of a stroke. These may include: 
¨ Sudden numbness, tingling, weakness, or loss of movement in your face, arm, or leg, especially on only one side of your body. ¨ Sudden vision changes. ¨ Sudden trouble speaking. ¨ Sudden confusion or trouble understanding simple statements. ¨ Sudden problems with walking or balance. ¨ A sudden, severe headache that is different from past headaches. Call 911 even if these symptoms go away in a few minutes. ? · You feel like you are having another TIA. ? Watch closely for changes in your health, and be sure to contact your doctor if you have any problems. Where can you learn more? Go to http://nita-jamia.info/. Enter [phone] in the search box to learn more about \"Transient Ischemic Attack: Care Instructions. \" Current as of: March 20, 2017 Content Version: 11.4 © 9331-0875 Healthwise, Incorporated.  Care instructions adapted under license by [address] (which disclaims liability or warranty for this information). If you have questions about a medical condition or this instruction, always ask your healthcare professional. [address] any warranty or liability for your use of this information. Hypokalemia: Care Instructions Your Care Instructions Hypokalemia (say \"wm-wj-gab-SHARLENE-mi-uh\") is a low level of potassium. The heart, muscles, kidneys, and nervous system all need potassium to work well. This problem has many different causes. Kidney problems, diet, and medicines like diuretics and laxatives can cause it. So can vomiting or diarrhea. In some cases, cancer is the cause. Your doctor may do tests to find the cause of your low potassium levels. You may need medicines to bring your potassium levels back to normal. You may also need regular blood tests to check your potassium. If you have very low potassium, you may need intravenous (IV) medicines. You also may need tests to check the electrical activity of your heart. Heart problems caused by low potassium levels can be very serious. Follow-up care is a key part of your treatment and safety. Be sure to make and go to all appointments, and call your doctor if you are having problems. It's also a good idea to know your test results and keep a list of the medicines you take. How can you care for yourself at home? · If your doctor recommends it, eat foods that have a lot of potassium. These include fresh fruits, juices, and vegetables. They also include nuts, beans, and milk. · Be safe with medicines. If your doctor prescribes medicines or potassium supplements, take them exactly as directed. Call your doctor if you have any problems with your medicines. · Get your potassium levels tested as often as your doctor tells you. When should you call for help? Call 911 anytime you think you may need emergency care. For example, call if: ? · You feel like your heart is missing beats. Heart problems caused by low potassium can cause death. ? · You passed out (lost consciousness). ? · You have a seizure. ?Call your doctor now or seek immediate medical care if: 
? · You feel weak or unusually tired. ? · You have severe arm or leg cramps. ? · You have tingling or numbness. ? · You feel sick to your stomach, or you vomit. ? · You have belly cramps. ? · You feel bloated or constipated. ? · You have to urinate a lot. ? · You feel very thirsty most of the time. ? · You are dizzy or lightheaded, or you feel like you may faint. ? · You feel depressed, or you lose touch with reality. ? Watch closely for changes in your health, and be sure to contact your doctor if: 
? · You do not get better as expected. Where can you learn more? Go to http://nita-jamia.info/. Enter G358 in the search box to learn more about \"Hypokalemia: Care Instructions. \" Current as of: May 12, 2017 Content Version: 11.4 © 4891-3099 [employer]. Care instructions adapted under license by [employer] (which disclaims liability or warranty for this information). If you have questions about a medical condition or this instruction, always ask your healthcare professional. [address] any warranty or liability for your use of this information. .  The patient and guardian verbalized understanding. Discharge medications reviewed with the patient and guardian and appropriate educational materials and side effects teaching were provided. ___________________________________________________________________________________________________________________________________ MyChart Announcement We are excited to announce that we are making your provider's discharge notes available to you in MyChart.   You will see these notes when they are completed and signed by the physician that discharged you from your recent hospital stay. If you have any questions or concerns about any information you see in Zauber, please call the Health Information Department where you were seen or reach out to your Primary Care Provider for more information about your plan of care. Introducing [facility]! [employer] introduces Zauber patient portal. Now you can access parts of your medical record, email your doctor's office, and request medication refills online. 1. In your internet browser, go to [email or web address] 2. Click on the First Time User? Click Here link in the Sign In box. You will see the New Member Sign Up page. 3. Enter your Zauber Access Code exactly as it appears below. You will not need to use this code after youve completed the sign-up process. If you do not sign up before the expiration date, you must request a new code. · Zauber Access Code: [email or web address] Expires: 9/19/2018  7:35 AM 
 
4. Enter the last four digits of your Social Security Number (xxxx) and Date of Birth (mm/dd/yyyy) as indicated and click Submit. You will be taken to the next sign-up page. 5. Create a Zauber ID. This will be your Zauber login ID and cannot be changed, so think of one that is secure and easy to remember. 6. Create a Zauber password. You can change your password at any time. 7. Enter your Password Reset Question and Answer. This can be used at a later time if you forget your password. 8. Enter your e-mail address. You will receive e-mail notification when new information is available in [address] 9. Click Sign Up. You can now view and download portions of your medical record. 10. Click the Download Summary menu link to download a portable copy of your medical information.  
 
If you have questions, please visit the Frequently Asked Questions section of the [employer]. Remember, Harahart is NOT to be used for urgent needs. For medical emergencies, dial 911. Now available from your iPhone and Android! Introducing Mark Cody As a [facility] patient, I wanted to make you aware of our electronic visit tool called Mark Cody. Egomotion 24/7 allows you to connect within minutes with a medical provider 24 hours a day, seven days a week via a mobile device or tablet or logging into a secure website from your computer. You can access Mark Cody from anywhere in the [place]. A virtual visit might be right for you when you have a simple condition and feel like you just dont want to get out of bed, or cant get away from work for an appointment, when your regular [facility] provider is not available (evenings, weekends or holidays), or when youre out of town and need minor care. Electronic visits cost only $49 and if the Egomotion 24/7 provider determines a prescription is needed to treat your condition, one can be electronically transmitted to a nearby pharmacy*. Please take a moment to enroll today if you have not already done so. The enrollment process is free and takes just a few minutes. To enroll, please download the Cherl Grain 24/7 joceline to your tablet or phone, or visit [email or web address] to enroll on your computer. And, as an [address] [place] [address] patient with a [email or web address] account, the results of your visits will be scanned into your electronic medical record and your primary care provider will be able to view the scanned results. We urge you to continue to see your regular [facility] provider for your ongoing medical care. And while your primary care provider may not be the one available when you seek a Mark Cody virtual visit, the peace of mind you get from getting a real diagnosis real time can be priceless. For more information on Mark Cody, view our Frequently Asked Questions (FAQs) at [email or web address]. Sincerely, 
 
Marilyn Hart MD 
Chief Medical Officer [employer] *:  certain medications cannot be prescribed via Mark Cody Providers Seen During Your Hospitalization Provider Specialty Primary office phone Lizabeth Trevizo MD Emergency Medicine [phone] Tyler Coleman MD Internal Medicine [phone] Bruce Mason MD Internal Medicine [phone] Your Primary Care Physician (PCP) Primary Care Physician Office Phone Office Fax Atilio Erwin [phone] You are allergic to the following No active allergies Recent Documentation Height Weight BMI OB Status Smoking Status 1.626 m 47.2 kg 17.85 kg/m2 Postmenopausal Never Smoker Emergency Contacts Name Discharge Info Relation Home Work Mobile JeffersonMaureen (Poa) DISCHARGE CAREGIVER [3] Daughter [21] [phone] Patient Belongings The following personal items are in your possession at time of discharge: 
  Dental Appliances: None  Visual Aid: Glasses, At home      Home Medications: None   Jewelry: None  Clothing: Sent home    Other Valuables: None Please provide this summary of care documentation to your next provider. Signatures-by signing, you are acknowledging that this After Visit Summary has been reviewed with you and you have received a copy. Patient Signature:  ____________________________________________________________ Date:  ____________________________________________________________  
  
[facility] Provider Signature:  ____________________________________________________________ Date:  ____________________________________________________________

## 2018-07-01 NOTE — ED TRIAGE NOTES
Pt brought in by EMS with family c/o pt having stroke sx's 1 hour PTA with sx's of left sided facial drooping, left sided weakness, drooling, and confusion. EMS report that pt was slightly confused upon their arrival and appeared to be drooling. Pt is now A&OX4 per EMS with no complaints.

## 2018-07-01 NOTE — ROUTINE PROCESS
TRANSFER - OUT REPORT:    Verbal report given to Leif Nash RN(name) on Sav Johnson  being transferred to [employer] (unit) for routine progression of care       Report consisted of patients Situation, Background, Assessment and   Recommendations(SBAR). Information from the following report(s) SBAR, ED Summary, Intake/Output, MAR and Recent Results was reviewed with the receiving nurse. Lines:       Opportunity for questions and clarification was provided.       Patient transported with:   Monitor  O2 @ 2 liters  Registered Nurse

## 2018-07-01 NOTE — CONSULTS
Brea Liao is a 80 y.o., left handed female, with a possible history of hypertension, as well as TIAs in the past giving her some transient left sided weakness, admitted and discharged from [facility].  She's not on any medications as an outpatient including not being on aspirin therapy at this time. About 10:30 today she developed right sided facial drooping and some weakness of the right arm. Her symptoms did not last a few minutes and then resolved. According to her daughter who is here she had exactly the same set of symptoms approximately 3 years ago and was admitted to another [facility] hospital for this problem. While there she had an extensive workup and was placed on aspirin therapy. For reasons that are uncertain she stop the aspirin and has since been on essentially no medications. Currently she feels back to her baseline. Social History; , lives with daughter. Denies alcohol, smoking or illicit drug use. Worked [employer]. Family History; mother  from cancer, had hypertension. Father  unknown reasons. 2 sisters, in good health age 80 and 80.       Current Facility-Administered Medications   Medication Dose Route Frequency Provider Last Rate Last Dose    0.9% sodium chloride infusion  100 mL/hr IntraVENous CONTINUOUS Gianna Naqvi  mL/hr at 18 1208 100 mL/hr at 18 1208    [START ON 2018] amLODIPine (NORVASC) tablet 5 mg  5 mg Oral DAILY Dot Mehta MD        [START ON 2018] aspirin (ASPIRIN) tablet 325 mg  325 mg Oral DAILY Dot Mehta MD        dorzolamide-timolol (COSOPT) 22.3-6.8 mg/mL ophthalmic solution 1 Drop  1 Drop Right Eye BID Dot Mehta MD        [START ON 2018] levothyroxine (SYNTHROID) tablet 88 mcg  88 mcg Oral ACB Dot Mehta MD        [START ON 2018] losartan-hydroCHLOROthiazide (HYZAAR) 100-12.5 mg per tablet 1 Tab  1 Tab Oral DAILY Dot Mehta MD        simvastatin (ZOCOR) tablet 20 mg  20 mg Oral QHS Nancy Otero MD        sodium chloride (NS) flush 5-10 mL  5-10 mL IntraVENous Q8H Nancy Otero MD        sodium chloride (NS) flush 5-10 mL  5-10 mL IntraVENous PRN Nancy Otero MD        acetaminophen (TYLENOL) tablet 650 mg  650 mg Oral Q4H PRN Nancy Otero MD        heparin (porcine) injection 5,000 Units  5,000 Units SubCUTAneous Irma Schaefer MD           Past Medical History:   Diagnosis Date    DDD (degenerative disc disease), cervical 1-28-02    Diverticulosis 7-99    by BE    DJD (degenerative joint disease) of knee 12-29-03    right knee    Fatigue 5-14-04 5-7-04 TSH =1.7, 12-29-06 TSH=2.67, 5-2008 TSH=1.3    Glaucoma 12-99    cataract- s/p left CATS w/ IOL    Hypercholesterolemia     (LDL)    Hypertension     Hypothyroidism     post surg    Osteopenia 2-15-02    SPINE & HIP BY DXA,      VIT D =31 ()    Sacroiliac joint pain 9/2014    Scoliosis 9/2014    Shingles     left forehead    Vitamin D deficiency 8/19/2010       Past Surgical History:   Procedure Laterality Date    HX MOHS PROCEDURES  9-2004    LEFT    NH COLONOSCOPY FLX DX W/COLLJ SPEC WHEN PFRMD         No Known Allergies    Patient Active Problem List   Diagnosis Code    Essential hypertension, benign I10    Hypothyroidism E03.9    LBP (low back pain) M54.5    Hypercholesterolemia E78.00    Vitamin D deficiency E55.9    DJD (degenerative joint disease) of knee M17.10    Osteopenia M85.80    ACP (advance care planning) Z71.89    TIA (transient ischemic attack) G45.9         Review of Systems:   As above otherwise 11 point review of systems negative including;   Constitutional no fever or chills  Skin denies rash or itching  HENT  Denies tinnitus, hearing lose  Eyes denies diplopia vision lose  Respiratory denies shortness of breath  Cardiovascular denies chest pain, dyspnea on exertion  Gastrointestinal denies nausea, vomiting, diarrhea, constipation  Genitourinary denies incontinence  Musculoskeletal denies joint pain or swelling  Endocrine denies weight change  Hematology denies easy bruising or bleeding   Neurological as above in HPI      PHYSICAL EXAMINATION:      VITAL SIGNS:    Visit Vitals    /61    Pulse 69    Temp 97.5 °F (36.4 °C)    Resp 12    Ht 5' 4\" (1.626 m)    Wt 47.2 kg (104 lb)    SpO2 97%    BMI 17.85 kg/m2       GENERAL: The patient is well developed, well nourished, and in no apparent distress. EXTREMITIES: No clubbing, cyanosis, or edema is identified. Pulses 2+ and symmetrical.  Muscle tone is normal.  HEAD:   Ear, nose, and throat appear to be without trauma. The patient is normocephalic. NEUROLOGIC EXAMINATION    MENTAL STATUS: The patient is awake, alert, and oriented x 2. Fund of knowledge is adequate. Speech is fluent and memory appears to be poor for short-term remembering only 1 item after 1 minute. CRANIAL NERVES: II  Visual fields are full to confrontation. Funduscopic examination reveals flat disks bilaterally. Pupils are both 3 mm and briskly reactive to light and accommodation. III, IV, VI  Extraocular movements are intact and there is no nystagmus. V  Facial sensation is intact to pinprick and light touch. VII  Face is symmetrical.   VIII - Hearing is present. IX, X, [address] rises symmetrically. Gag is present. Tongue is in the midline. XI - Shoulder shrugging and head turning intact  MOTOR:  The patient is 5/5 in all four limbs without any drift. Fine finger movements are symmetrical.  Isolated motor group testing reveals no focal abnormalities. Tone is normal.  Sensory examination is intact to pinprick, light touch and position sense testing. Reflexes are 2+ and symmetrical. Plantars are down going. Cerebellar examination reveals no gross ataxia or dysmetria. Gait is not tested at this time.        Final result (Exam End: 7/1/2018 11:39 AM) Open    Study Result EXAM:  CT HEAD WO CONT     INDICATION:  Stroke     COMPARISON: None.     Radiation safety: All CT examinations at this institution for performed with  radiation reduction technique including automated KV and MA based on patient's  body size and body part scanned. Multiplanar reconstruction images were  performed to visualize structures in multiple planes without additional  radiation exposure.     Noncontrast CT images of the head were obtained in the axial plane. Sagittal and  coronal reconstruction images were performed.     FINDINGS:     There is prominence of the lateral ventricles and cortical sulci due to atrophy. There is no midline shift. There is moderate degree of periventricular white  matter lucencies presumably related to microvascular ischemia. Brainstem and  cerebellum appear intact.     Skull appear intact.     No acute stroke. No mass noted. No evidence for hemorrhage.      IMPRESSION  IMPRESSION:  1. No acute infarct or intracranial hemorrhage identified on CT. I have reviewed the above imagines myself.        CBC:   Lab Results   Component Value Date/Time    WBC 5.4 07/01/2018 11:50 AM    RBC 3.78 (L) 07/01/2018 11:50 AM    HGB 10.5 (L) 07/01/2018 11:50 AM    HCT 32.2 (L) 07/01/2018 11:50 AM    PLATELET 906 (L) 01/94/5173 11:50 AM     BMP:   Lab Results   Component Value Date/Time    Glucose 142 (H) 07/01/2018 11:50 AM    Sodium 137 07/01/2018 11:50 AM    Potassium 3.1 (L) 07/01/2018 11:50 AM    Chloride 98 (L) 07/01/2018 11:50 AM    CO2 27 07/01/2018 11:50 AM    BUN 15 07/01/2018 11:50 AM    Creatinine 1.06 07/01/2018 11:50 AM    Calcium 8.7 07/01/2018 11:50 AM     CMP:   Lab Results   Component Value Date/Time    Glucose 142 (H) 07/01/2018 11:50 AM    Sodium 137 07/01/2018 11:50 AM    Potassium 3.1 (L) 07/01/2018 11:50 AM    Chloride 98 (L) 07/01/2018 11:50 AM    CO2 27 07/01/2018 11:50 AM    BUN 15 07/01/2018 11:50 AM    Creatinine 1.06 07/01/2018 11:50 AM    Calcium 8.7 07/01/2018 11:50 AM    Anion gap 12 07/01/2018 11:50 AM    BUN/Creatinine ratio 14 07/01/2018 11:50 AM    Alk. phosphatase 85 07/01/2018 11:50 AM    Protein, total 7.6 07/01/2018 11:50 AM    Albumin 2.7 (L) 07/01/2018 11:50 AM    Globulin 4.9 (H) 07/01/2018 11:50 AM    A-G Ratio 0.6 (L) 07/01/2018 11:50 AM     Coagulation:   Lab Results   Component Value Date/Time    Prothrombin time 14.1 07/01/2018 11:50 AM    INR 1.1 07/01/2018 11:50 AM    aPTT 36.4 07/01/2018 11:50 AM     Cardiac markers:   Lab Results   Component Value Date/Time    CK 67 07/01/2018 11:50 AM    CK-MB Index  07/01/2018 11:50 AM     CALCULATION NOT PERFORMED WHEN RESULT IS BELOW LINEAR LIMIT          Impression: TIA, recurrent in this patient who has risk factors including hypertension, possibly diabetes, noncompliance with medications, prior TIAs. It appears that this patient has had the exact set of symptoms in the past and was placed on aspirin therapy appropriately. She has since stopped her aspirin therapy and has had recurrent events. Uncertain whether she was told to stop the medicine or whether she just stopped it on her own. In any case she was on no preventative medication for TIA or stroke. Plan: Get her back on her aspirin therapy. Determine whether she is hypertensive in place her on antihypertensive and antidiabetic medications if necessary and warranted. Statin agent. Must impress upon her the need to be compliant with her medication regimen. Thank you for allowing me to evaluate this patient. PLEASE NOTE:   This document has been produced using voice recognition software. Unrecognized errors in transcription may be present.

## 2018-07-01 NOTE — H&P
Hospitalist Admission Note    NAME: Christa Diop   :  4/10/1927   MRN:  [id number]     Date/Time of admission:  2018 12:46 PM    Patient PCP: No primary care provider on file.  ________________________________________________________________________    My assessment of this patient's clinical condition and my plan of care is as follows. Assessment / Plan:  1. TIA (Transient Ischemic Attack)  2. Benign HTN  3. Advanced age  3. H/o TIA's in past, now off asa    1. Admit to neuro for tele monitoring and to r/o cva  2. Routine protocol, lipids, tsh, a1c  3. MRI brain, echo, carotid pvl's  4. PT/OT  5. Would lean on minimalism with medication treatment as to avoid polypharmacy which can be just as/more detrimental  6. Neuro to be consulted by ED. Code Status: full   Surrogate Decision Maker: dtr    DVT Prophylaxis: sc hep  GI Prophylaxis: not indicated          Subjective:   CHIEF COMPLAINT: left sided weakness    HISTORY OF PRESENT ILLNESS:     Wanda Felty is a 80 y.o.  female who presents with transient and resolved neurological symptoms. Pt has the pmhx of HTN, TIA and osteopenia. Pt was noted to have developed left facial droop and slurred speech with left sided weakness today at about 10:30 am. This was seen by her dtr and upon EMS arrival, symptoms had resolved. Pt arrived to the ED at baseline. She has reportedly had these symptoms several times over the past 2-3 years. A code S was called in the ED. No aggressive measures were recommended, per chart review. We were asked to admit for work up and evaluation of the above problems.      Past Medical History:   Diagnosis Date    DDD (degenerative disc disease), cervical 02    Diverticulosis     by BE    DJD (degenerative joint disease) of knee 03    right knee    Fatigue 04 TSH =1.7, 06 TSH=2.67,  TSH=1.3    Glaucoma     cataract- s/p left CATS w/ IOL    Hypercholesterolemia     (LDL)    Hypertension     Hypothyroidism     post surg    Osteopenia 2-15-02    SPINE & HIP BY DXA,      VIT D =31 ()    Sacroiliac joint pain 9/2014    Scoliosis 9/2014    Shingles     left forehead    Vitamin D deficiency 8/19/2010        Past Surgical History:   Procedure Laterality Date    HX MOHS PROCEDURES  9-2004    LEFT    IA COLONOSCOPY FLX DX W/COLLJ SPEC WHEN PFRMD         Social History   Substance Use Topics    Smoking status: Never Smoker    Smokeless tobacco: Never Used    Alcohol use No        Family History   Problem Relation Age of Onset    Breast Cancer Mother     Hypertension Mother     Heart Attack Father     Hypertension Sister     Hypertension Brother     Stroke Brother     Hypertension Sister     Hypertension Brother     Heart Attack Brother     Breast Cancer Sister     Hypertension Sister     Thyroid Disease Daughter      No Known Allergies     Prior to Admission medications    Medication Sig Start Date End Date Taking? Authorizing Provider   Colonel Braxton raines 1 Units by Does Not Apply route daily. Walker with seat 6/21/18   Lisset PRITCHETT Scissom   KLOR-CON M10 10 mEq tablet TAKE ONE TABLET BY MOUTH TWICE DAILY 1/3/17   Salvador Gallegos MD   losartan-hydrochlorothiazide [facility]) 100-12.5 mg per tablet Take 1 Tab by mouth daily. 6/18/16   Salvador Gallegos MD   oxyCODONE-acetaminophen (PERCOCET) 5-325 mg per tablet Take 1 Tab by mouth every four (4) hours as needed for Pain. Max Daily Amount: 6 Tabs. 5/17/16   Salvador Gallegos MD   amLODIPine (NORVASC) 5 mg tablet Take 1 Tab by mouth daily. 4/14/16   Salvador Gallegos MD   OTHER Compound cream 11/16/15   Historical Provider   ALPRAZolam Estil Massa) 0.25 mg tablet Take 1 Tab by mouth nightly as needed for Anxiety. Max Daily Amount: 0.25 mg. 12/14/15   Salvador Gallegos MD   levothyroxine (SYNTHROID) 88 mcg tablet Take 1 Tab by mouth Daily (before breakfast).  12/14/15   Salvador Gallegos MD simvastatin (ZOCOR) 20 mg tablet Take  by mouth nightly. Historical Provider   aspirin (ASPIRIN) 325 mg tablet Take 325 mg by mouth daily. Historical Provider   psyllium seed-sucrose (METAMUCIL) Powd Take 1 Units by mouth two (2) times daily as needed. 1 tsp. in a glass of water b.i.d.prn 6/23/11   Chipper Dakins, MD   Cholecalciferol, Vitamin D3, (VITAMIN D) 2,000 unit Cap Take 1 Tab by mouth daily. 4/6/10   Historical Provider   dorzolamide-timolol (COSOPT) 2-0.5 % ophthalmic solution Administer 1 Drop to right eye two (2) times a day. Historical Provider       REVIEW OF SYSTEMS:     I am not able to complete the review of systems because:    The patient is intubated and sedated    The patient has altered mental status due to his acute medical problems    The patient has baseline aphasia from prior stroke(s)    The patient has baseline dementia and is not reliable historian    The patient is in acute medical distress and unable to provide information           Total of 12 systems reviewed as follows:       POSITIVE= bolded text  Negative = text not underlined  General:  fever, chills, sweats, generalized weakness, weight loss/gain,      loss of appetite   Eyes:    blurred vision, eye pain, loss of vision, double vision  ENT:    rhinorrhea, pharyngitis   Respiratory:   cough, sputum production, SOB, PEÑALOZA, wheezing, pleuritic pain   Cardiology:   chest pain, palpitations, orthopnea, PND, edema, syncope   Gastrointestinal:  abdominal pain , N/V, diarrhea, dysphagia, constipation, bleeding   Genitourinary:  frequency, urgency, dysuria, hematuria, incontinence   Muskuloskeletal :  arthralgia, myalgia, back pain  Hematology:  easy bruising, nose or gum bleeding, lymphadenopathy   Dermatological: rash, ulceration, pruritis, color change / jaundice  Endocrine:   hot flashes or polydipsia   Neurological:  headache, dizziness, confusion, focal weakness, paresthesia,     Speech difficulties, memory loss, gait difficulty  Psychological: Feelings of anxiety, depression, agitation    Objective:   VITALS:    Visit Vitals    /53    Pulse 80    Temp 97.7 °F (36.5 °C)    Resp 18    SpO2 (!) 88%       PHYSICAL EXAM:    General:    Alert, cooperative, no distress, appears stated age. HEENT: Atraumatic, anicteric sclerae, pink conjunctivae     No oral ulcers, mucosa moist, throat clear, dentition poor  Neck:  Supple, symmetrical,  thyroid: non tender  Lungs:   Clear to auscultation bilaterally. No Wheezing or Rhonchi. No rales. Chest wall:  No tenderness  No Accessory muscle use. Heart:   Regular  rhythm,  No  murmur   No edema  Abdomen:   Soft, non-tender. Not distended. Bowel sounds normal  Extremities: No cyanosis. No clubbing,      Skin turgor normal, Capillary refill normal, Radial dial pulse 2+  Skin:     Not pale. Not Jaundiced  No rashes   Neurologic: EOMs intact. No facial asymmetry. No aphasia or slurred speech. Symmetrical strength, Sensation grossly intact. Alert and oriented X 2.     _______________________________________________________________________  Care Plan discussed with:    Comments   Patient x    Family  x    RN     Care Manager                    Consultant:      _______________________________________________________________________  Expected  Disposition:   Home with Family x   HH/PT/OT/RN    SNF/LTC    TREASURE    ________________________________________________________________________  TOTAL TIME:  39 Minutes    Critical Care Provided     Minutes non procedure based      Comments    x Reviewed previous records   >50% of visit spent in counseling and coordination of care x Discussion with patient and/or family and questions answered       ________________________________________________________________________      Procedures: see electronic medical records for all procedures/Xrays and details which were not copied into this note but were reviewed prior to creation of Plan.     LAB DATA REVIEWED:    Recent Results (from the past 24 hour(s))   CBC WITH AUTOMATED DIFF    Collection Time: 07/01/18 11:50 AM   Result Value Ref Range    WBC 5.4 4.6 - 13.2 K/uL    RBC 3.78 (L) 4.20 - 5.30 M/uL    HGB 10.5 (L) 12.0 - 16.0 g/dL    HCT 32.2 (L) 35.0 - 45.0 %    MCV 85.2 74.0 - 97.0 FL    MCH 27.8 24.0 - 34.0 PG    MCHC 32.6 31.0 - 37.0 g/dL    RDW 15.5 (H) 11.6 - 14.5 %    PLATELET 661 (L) 482 - 420 K/uL    NEUTROPHILS PENDING %    LYMPHOCYTES PENDING %    MONOCYTES PENDING %    EOSINOPHILS PENDING %    BASOPHILS PENDING %    ABS. NEUTROPHILS PENDING K/UL    ABS. LYMPHOCYTES PENDING K/UL    ABS. MONOCYTES PENDING K/UL    ABS. EOSINOPHILS PENDING K/UL    ABS. BASOPHILS PENDING K/UL    DF PENDING    METABOLIC PANEL, COMPREHENSIVE    Collection Time: 07/01/18 11:50 AM   Result Value Ref Range    Sodium 137 136 - 145 mmol/L    Potassium 3.1 (L) 3.5 - 5.5 mmol/L    Chloride 98 (L) 100 - 108 mmol/L    CO2 27 21 - 32 mmol/L    Anion gap 12 3.0 - 18 mmol/L    Glucose 142 (H) 74 - 99 mg/dL    BUN 15 7.0 - 18 MG/DL    Creatinine 1.06 0.6 - 1.3 MG/DL    BUN/Creatinine ratio 14 12 - 20      GFR est AA 59 (L) >60 ml/min/1.73m2    GFR est non-AA 49 (L) >60 ml/min/1.73m2    Calcium 8.7 8.5 - 10.1 MG/DL    Bilirubin, total 0.8 0.2 - 1.0 MG/DL    ALT (SGPT) 11 (L) 13 - 56 U/L    AST (SGOT) 10 (L) 15 - 37 U/L    Alk.  phosphatase 85 45 - 117 U/L    Protein, total 7.6 6.4 - 8.2 g/dL    Albumin 2.7 (L) 3.4 - 5.0 g/dL    Globulin 4.9 (H) 2.0 - 4.0 g/dL    A-G Ratio 0.6 (L) 0.8 - 1.7     PTT    Collection Time: 07/01/18 11:50 AM   Result Value Ref Range    aPTT 36.4 23.0 - 36.4 SEC   PROTHROMBIN TIME + INR    Collection Time: 07/01/18 11:50 AM   Result Value Ref Range    Prothrombin time 14.1 11.5 - 15.2 sec    INR 1.1 0.8 - 1.2     CARDIAC PANEL,(CK, CKMB & TROPONIN)    Collection Time: 07/01/18 11:50 AM   Result Value Ref Range    CK 67 26 - 192 U/L    CK - MB <1.0 <3.6 ng/ml    CK-MB Index  0.0 - 4.0 %     CALCULATION NOT PERFORMED WHEN RESULT IS BELOW LINEAR LIMIT    Troponin-I, Qt. <0.02 0.0 - 0.045 NG/ML   GLUCOSE, POC    Collection Time: 07/01/18 11:53 AM   Result Value Ref Range    Glucose (POC) 92 70 - 110 mg/dL       Edel Leslie MD  Internal Medicine  Hospitalist Division

## 2018-07-01 NOTE — IP AVS SNAPSHOT
Pao Tapia 
 
 
 [address] [facility] [address] Patient: Christa Diop MRN: [id number] :4/10/1927 A check mynor indicates which time of day the medication should be taken. My Medications CHANGE how you take these medications Instructions Each Dose to Equal  
 Morning Noon Evening Bedtime OTHER What changed:  additional instructions Your last dose was: Your next dose is:    
   
   
 Cbc  BMP  Magnesium  - send results to Sujata Swenson 100 patient's PCP potassium chloride 10 mEq tablet Commonly known as:  KLOR-CON M10 What changed:  See the new instructions. Your last dose was: Your next dose is: Take 2 Tabs by mouth daily. Indications: hypokalemia 20 mEq CONTINUE taking these medications Instructions Each Dose to Equal  
 Morning Noon Evening Bedtime ALPRAZolam 0.25 mg tablet Commonly known as:  Diann Lennox Your last dose was: Your next dose is: Take 1 Tab by mouth nightly as needed for Anxiety. Max Daily Amount: 0.25 mg.  
 0.25 mg  
    
   
   
   
  
 amLODIPine 5 mg tablet Commonly known as:  Jaime Mancilla Your last dose was: Your next dose is: Take 1 Tab by mouth daily. 5 mg  
    
   
   
   
  
 aspirin 325 mg tablet Commonly known as:  ASPIRIN Your last dose was: Your next dose is: Take 325 mg by mouth daily. 325 mg  
    
   
   
   
  
 ATIVAN 1 mg tablet Generic drug:  LORazepam  
   
Your last dose was: Your next dose is: Take 1 mg by mouth every six (6) hours as needed for Anxiety. 1 mg COSOPT 22.3-6.8 mg/mL ophthalmic solution Generic drug:  dorzolamide-timolol Your last dose was: Your next dose is:    
   
   
 Administer 1 Drop to right eye two (2) times a day. 1 Drop  
    
   
   
   
  
 levothyroxine 88 mcg tablet Commonly known as:  SYNTHROID Your last dose was: Your next dose is: Take 1 Tab by mouth Daily (before breakfast). 88 mcg  
    
   
   
   
  
 losartan-hydroCHLOROthiazide 100-12.5 mg per tablet Commonly known as:  HYZAAR Your last dose was: Your next dose is: Take 1 Tab by mouth daily. 1 Tab  
    
   
   
   
  
 psyllium seed-sucrose Powd Commonly known as:  METAMUCIL (SUGAR) Your last dose was: Your next dose is: Take 1 Units by mouth two (2) times daily as needed. 1 tsp. in a glass of water b.i.d.prn  
 1 Units VITAMIN D 2,000 unit Cap capsule Generic drug:  Cholecalciferol (Vitamin D3) Your last dose was: Your next dose is: Take 1 Tab by mouth daily. 1 Tab Naty Petrin Your last dose was: Your next dose is:    
   
   
 1 Units by Does Not Apply route daily. Walker with seat  
 1 Units ZOCOR 20 mg tablet Generic drug:  simvastatin Your last dose was: Your next dose is: Take  by mouth nightly. STOP taking these medications   
 oxyCODONE-acetaminophen 5-325 mg per tablet Commonly known as:  PERCOCET Where to Get Your Medications Information on where to get these meds will be given to you by the nurse or doctor. ! Ask your nurse or doctor about these medications OTHER  
 potassium chloride 10 mEq tablet

## 2018-07-01 NOTE — ROUTINE PROCESS
TRANSFER - IN REPORT:    Verbal report received from Torsten GARCIARN(name) on Epimenio   being received from ED(unit) for routine progression of care      Report consisted of patients Situation, Background, Assessment and   Recommendations(SBAR). Information from the following report(s) SBAR, Kardex, ED Summary and Recent Results was reviewed with the receiving nurse. Opportunity for questions and clarification was provided. Assessment completed upon patients arrival to unit and care assumed.

## 2018-07-01 NOTE — ED PROVIDER NOTES
EMERGENCY DEPARTMENT HISTORY AND PHYSICAL EXAM    11:18 AM      Date: 7/1/2018  Patient Name: Ary Navarro    History of Presenting Illness     Chief Complaint   Patient presents with    TIA         History Provided By: Patient and EMS    Chief Complaint: TIA  Duration:  10:25AM Today   Timing:  Improving  Location: Left-sided of face, and left upper and lower extremities   Quality: N/A  Severity: Patient denies having any pain. Modifying Factors: Patient's symptoms resolved spontaneously. Associated Symptoms: Patient did not dictate       Additional History (Context): Ary Navarro is a 80 y.o. female with PMHx of HTN, TIA, Diverticulosis, DJD, and Osteopenia who presents with improved left-sided facial droop onset 10:25AM Today (approximate). Per EMS, the patient's daughter walked into the patient's room, and fond her with left-sided facial droop, slurred speech, drooling, and left-sided weakness. States that the incident occurred at 10:25AM. EMS states that, upon their arrival, they found that the patient's symptoms resolved; Patient currently has no complaints. EMS states that per daughter, the patient has had a hx of 2-3 TIAs within the last 2 years. Code S was called in the ED at 11:28AM. No other concerns were expressed at this time. HPI is limited secondary to the patient's mental status change. PCP: No primary care provider on file.     Current Facility-Administered Medications   Medication Dose Route Frequency Provider Last Rate Last Dose    0.9% sodium chloride infusion  100 mL/hr IntraVENous CONTINUOUS Alejandra Lopez  mL/hr at 07/01/18 1208 100 mL/hr at 07/01/18 1208    [START ON 7/2/2018] aspirin (ASPIRIN) tablet 325 mg  325 mg Oral DAILY Jaylyn Tatum MD        dorzolamide-timolol (COSOPT) 22.3-6.8 mg/mL ophthalmic solution 1 Drop  1 Drop Right Eye BID Keene Parent, MD  Katrina Saint ON 7/2/2018] levothyroxine (SYNTHROID) tablet 88 mcg  88 mcg Oral ACB Jaylyn Tatum MD       [address] [place] [address] simvastatin (ZOCOR) tablet 20 mg  20 mg Oral QHS Rebecca Valencia MD        sodium chloride (NS) flush 5-10 mL  5-10 mL IntraVENous Q8H Rebecca Valencia MD        sodium chloride (NS) flush 5-10 mL  5-10 mL IntraVENous PRN Rebecca Valencia MD        acetaminophen (TYLENOL) tablet 650 mg  650 mg Oral Q4H PRN Rebecca Valencia MD        heparin (porcine) injection 5,000 Units  5,000 Units SubCUTAneous Q8H Rebecca Valencia MD           Past History     Past Medical History:  Past Medical History:   Diagnosis Date    DDD (degenerative disc disease), cervical 1-28-02    Diverticulosis 7-99    by BE    DJD (degenerative joint disease) of knee 12-29-03    right knee    Fatigue 5-14-04 5-7-04 TSH =1.7, 12-29-06 TSH=2.67, 5-2008 TSH=1.3    Glaucoma 12-99    cataract- s/p left CATS w/ IOL    Hypercholesterolemia     (LDL)    Hypertension     Hypothyroidism     post surg    Osteopenia 2-15-02    SPINE & HIP BY DXA,      VIT D =31 ()    Sacroiliac joint pain 9/2014    Scoliosis 9/2014    Shingles     left forehead    Vitamin D deficiency 8/19/2010       Past Surgical History:  Past Surgical History:   Procedure Laterality Date    HX MOHS PROCEDURES  9-2004    LEFT    SD COLONOSCOPY FLX DX W/COLLJ SPEC WHEN PFRMD         Family History:  Family History   Problem Relation Age of Onset    Breast Cancer Mother     Hypertension Mother     Heart Attack Father     Hypertension Sister     Hypertension Brother     Stroke Brother     Hypertension Sister     Hypertension Brother     Heart Attack Brother     Breast Cancer Sister     Hypertension Sister     Thyroid Disease Daughter        Social History:  Social History   Substance Use Topics    Smoking status: Never Smoker    Smokeless tobacco: Never Used    Alcohol use No       Allergies:  No Known Allergies      Review of Systems     Review of Systems   Unable to perform ROS: Mental status change         Physical Exam     Patient Vitals for the past 12 hrs:   Temp Pulse Resp BP SpO2   07/01/18 1600 97.4 °F (36.3 °C) 64 14 119/60 -   07/01/18 1518 97.5 °F (36.4 °C) 69 12 121/61 97 %   07/01/18 1345 - 67 13 114/49 -   07/01/18 1300 - 71 19 105/48 -   07/01/18 1145 - 78 11 112/45 (!) 82 %   07/01/18 1129 97.7 °F (36.5 °C) 80 18 129/53 (!) 88 %   07/01/18 1121 - 81 12 - -   07/01/18 1119 - - - 129/55 -       Physical Exam   Constitutional: She appears well-developed. HENT:   Head: Normocephalic and atraumatic. Eyes: Conjunctivae and EOM are normal.   Neck: Normal range of motion. Cardiovascular: Normal heart sounds. Exam reveals no gallop and no friction rub. No murmur heard. Pulmonary/Chest: Effort normal and breath sounds normal. No stridor. Abdominal: Soft. There is no tenderness. Musculoskeletal: Normal range of motion. She exhibits no tenderness. Neurological: She is alert. No cranial nerve deficit or sensory deficit. Coordination normal.   Alert and oriented to self and location   Unable to lift both feet against gravity, but it appears chronic    Skin: Skin is warm and dry. She is not diaphoretic. Psychiatric: She has a normal mood and affect. Her behavior is normal.   Nursing note and vitals reviewed. Diagnostic Study Results     Labs -  Recent Results (from the past 12 hour(s))   CBC WITH AUTOMATED DIFF    Collection Time: 07/01/18 11:50 AM   Result Value Ref Range    WBC 5.4 4.6 - 13.2 K/uL    RBC 3.78 (L) 4.20 - 5.30 M/uL    HGB 10.5 (L) 12.0 - 16.0 g/dL    HCT 32.2 (L) 35.0 - 45.0 %    MCV 85.2 74.0 - 97.0 FL    MCH 27.8 24.0 - 34.0 PG    MCHC 32.6 31.0 - 37.0 g/dL    RDW 15.5 (H) 11.6 - 14.5 %    PLATELET 299 (L) 651 - 420 K/uL    NEUTROPHILS 61 42 - 75 %    BAND NEUTROPHILS 8 (H) 0 - 5 %    LYMPHOCYTES 18 (L) 20 - 51 %    MONOCYTES 13 (H) 2 - 9 %    EOSINOPHILS 0 0 - 5 %    BASOPHILS 0 0 - 3 %    ABS. NEUTROPHILS 3.7 1.8 - 8.0 K/UL    ABS. LYMPHOCYTES 1.0 0.8 - 3.5 K/UL    ABS.  MONOCYTES 0.7 0 - 1.0 K/UL ABS. EOSINOPHILS 0.0 0.0 - 0.4 K/UL    ABS. BASOPHILS 0.0 0.0 - 0.06 K/UL    DF MANUAL      PLATELET COMMENTS DECREASED PLATELETS      RBC COMMENTS ANISOCYTOSIS  1+        RBC COMMENTS POIKILOCYTOSIS  1+        RBC COMMENTS SCHISTOCYTES  1+       METABOLIC PANEL, COMPREHENSIVE    Collection Time: 07/01/18 11:50 AM   Result Value Ref Range    Sodium 137 136 - 145 mmol/L    Potassium 3.1 (L) 3.5 - 5.5 mmol/L    Chloride 98 (L) 100 - 108 mmol/L    CO2 27 21 - 32 mmol/L    Anion gap 12 3.0 - 18 mmol/L    Glucose 142 (H) 74 - 99 mg/dL    BUN 15 7.0 - 18 MG/DL    Creatinine 1.06 0.6 - 1.3 MG/DL    BUN/Creatinine ratio 14 12 - 20      GFR est AA 59 (L) >60 ml/min/1.73m2    GFR est non-AA 49 (L) >60 ml/min/1.73m2    Calcium 8.7 8.5 - 10.1 MG/DL    Bilirubin, total 0.8 0.2 - 1.0 MG/DL    ALT (SGPT) 11 (L) 13 - 56 U/L    AST (SGOT) 10 (L) 15 - 37 U/L    Alk.  phosphatase 85 45 - 117 U/L    Protein, total 7.6 6.4 - 8.2 g/dL    Albumin 2.7 (L) 3.4 - 5.0 g/dL    Globulin 4.9 (H) 2.0 - 4.0 g/dL    A-G Ratio 0.6 (L) 0.8 - 1.7     PTT    Collection Time: 07/01/18 11:50 AM   Result Value Ref Range    aPTT 36.4 23.0 - 36.4 SEC   PROTHROMBIN TIME + INR    Collection Time: 07/01/18 11:50 AM   Result Value Ref Range    Prothrombin time 14.1 11.5 - 15.2 sec    INR 1.1 0.8 - 1.2     CARDIAC PANEL,(CK, CKMB & TROPONIN)    Collection Time: 07/01/18 11:50 AM   Result Value Ref Range    CK 67 26 - 192 U/L    CK - MB <1.0 <3.6 ng/ml    CK-MB Index  0.0 - 4.0 %     CALCULATION NOT PERFORMED WHEN RESULT IS BELOW LINEAR LIMIT    Troponin-I, Qt. <0.02 0.0 - 0.045 NG/ML   HEMOGLOBIN A1C W/O EAG    Collection Time: 07/01/18 11:50 AM   Result Value Ref Range    Hemoglobin A1c 5.1 4.2 - 5.6 %   GLUCOSE, POC    Collection Time: 07/01/18 11:53 AM   Result Value Ref Range    Glucose (POC) 92 70 - 110 mg/dL   EKG, 12 LEAD, INITIAL    Collection Time: 07/01/18  1:31 PM   Result Value Ref Range    Ventricular Rate 78 BPM    Atrial Rate 300 BPM    QRS Duration 70 ms    Q-T Interval 380 ms    QTC Calculation (Bezet) 433 ms    Calculated P Axis 66 degrees    Calculated R Axis 15 degrees    Calculated T Axis 57 degrees    Diagnosis       Sinus rhythm  Nonspecific ST abnormality  No previous ECGs available  Confirmed by Vaishali Islas ([id number]) on 7/1/2018 4:33:09 PM     GLUCOSE, POC    Collection Time: 07/01/18  4:49 PM   Result Value Ref Range    Glucose (POC) 82 70 - 110 mg/dL       Radiologic Studies -   CT HEAD WO CONT:    Negative CT Head, per Radiology    XR CHEST PORT  :    IMPRESSION:  No acute findings.             Medical Decision Making     Provider Notes (Medical Decision Making): Based on my history, physical exam, and diagnostic evaluation, the patient appears to have symptoms consistent with Transient Ischemic Attack (TIA). The pt's diagnostic evaluation including laboratory data, EKG (a flutter), Head CT and x-ray were unremarkable. There is no eveidence of an intracranial bleed and glucose was 142. The pt does not meet criteria for tPa administration. However, based upon the history and clinical findings, The patient will require further workup for Transient Ischemic Attack. The pt has been given aspirin in the ED. They have remained stable while in the emergency department. The patient is admitted and accepted by the admitting physician. I am the first provider for this patient. I reviewed the vital signs, available nursing notes, past medical history, past surgical history, family history and social history. Vital Signs-Reviewed the patient's vital signs. Pulse Oximetry Analysis -  88% on room air (Interpretation)    Cardiac Monitor:  Rate: 64bpm  Rhythm:  Atrial Flutter     EKG: Interpreted by the EP.    Time Interpreted: 13:31PM   Rate: 78bpm   Rhythm: Atrial Flutter    Interpretation: Non-specific STs   Comparison: No previous EKGs available    Records Reviewed: Nursing Notes and Triage notes  (Time of Review: 11:18 AM)    ED Course: Progress Notes, Reevaluation, and Consults:  Consult:  Discussed care with Jack Silverman Standard discussion; including history of patients chief complaint, available diagnostic results, and treatment course. Will come and evaluate the patient. 11:33 AM    Consult:  Discussed care with Jack Silverman Standard discussion; including history of patients chief complaint, available diagnostic results, and treatment course. Recommends admission for TIA workup. 12:05 PM    12:08PM  Patient's family is at the bedside. Family states that the patient was sitting up in bed since 12AM this morning, and when told to lay down, the patient states, \"Give me time. \" States that at 10:15AM, the patient states, \"I don't feel good. \" Patient's daughter states that when she walked to the kitchen, and returned, the patient was experiencing left-sided facial droop, left-sided weakness, and slurred speech. Reports hx of TIA in 2016, where she went to [facility], and was given Heparin, ASA, and Plavix. Reports that the patient's PCP has changed to Dr. Haris Tripathi at [facility]. I discussed with the family and the patient about the need for admission. Family and patient agree with the plan for admission. Consult:  Discussed care with Bryn Morejon Standard discussion; including history of patients chief complaint, available diagnostic results, and treatment course. Accepts the patient for admission. 12:26 PM    Consult:  Discussed care with Bryn Donovan Standard discussion; including history of patients chief complaint, available diagnostic results, and treatment course. Is aware of the patient, will eval.   12:36 PM        Critical Care Time:  The services I provided to this patient were to treat and/or prevent clinically significant deterioration that could result in the failure of one or more body systems and/or organ systems due to CVA/TIA.     Services included the following:  -reviewing nursing notes and old charts  -vital sign assessments  -direct patient care  -medication orders and management  -interpreting and reviewing diagnostic studies/labs  -re-evaluations  -documentation time    Aggregate critical care time was 35 minutes, which includes only time during which I was engaged in work directly related to the patient's care as described above, whether I was at bedside or elsewhere in the Emergency Department. It did not include time spent performing other reported procedures or the services of residents, students, nurses, or advance practice providers. Ishaan Escobedo MD    6:12 PM      For Hospitalized Patients:    1. Hospitalization Decision Time:  The decision to hospitalize the patient was made by Dr. Tere Thornton at 12:08PM on 7/1/2018    2. Aspirin: Aspirin was given    Diagnosis     Clinical Impression:   1. Transient cerebral ischemia, unspecified type        Disposition: Admission     Follow-up Information     None           Current Discharge Medication List      CONTINUE these medications which have NOT CHANGED    Details   LORazepam (ATIVAN) 1 mg tablet Take 1 mg by mouth every six (6) hours as needed for Anxiety. Walker misc 1 Units by Does Not Apply route daily. Walker with seat  Qty: 1 Each, Refills: 0      KLOR-CON M10 10 mEq tablet TAKE ONE TABLET BY MOUTH TWICE DAILY  Qty: 180 Tab, Refills: 0      losartan-hydrochlorothiazide (HYZAAR) 100-12.5 mg per tablet Take 1 Tab by mouth daily. Qty: 90 Tab, Refills: 3    Associated Diagnoses: Essential hypertension, benign      oxyCODONE-acetaminophen (PERCOCET) 5-325 mg per tablet Take 1 Tab by mouth every four (4) hours as needed for Pain. Max Daily Amount: 6 Tabs. Qty: 60 Tab, Refills: 0    Associated Diagnoses: Left hip pain      amLODIPine (NORVASC) 5 mg tablet Take 1 Tab by mouth daily.   Qty: 90 Tab, Refills: 1      OTHER Compound cream  Refills: 5      ALPRAZolam (XANAX) 0.25 mg tablet Take 1 Tab by mouth nightly as needed for Anxiety. Max Daily Amount: 0.25 mg.  Qty: 30 Tab, Refills: 1    Associated Diagnoses: Anxiety      levothyroxine (SYNTHROID) 88 mcg tablet Take 1 Tab by mouth Daily (before breakfast). Qty: 90 Tab, Refills: 3    Associated Diagnoses: Hypothyroidism, unspecified hypothyroidism type      simvastatin (ZOCOR) 20 mg tablet Take  by mouth nightly. aspirin (ASPIRIN) 325 mg tablet Take 325 mg by mouth daily. Associated Diagnoses: Other specified transient cerebral ischemias      psyllium seed-sucrose (METAMUCIL) Powd Take 1 Units by mouth two (2) times daily as needed. 1 tsp. in a glass of water b.i.d.prn  Qty: 2 Can, Refills: 3    Associated Diagnoses: Constipation      Cholecalciferol, Vitamin D3, (VITAMIN D) 2,000 unit Cap Take 1 Tab by mouth daily. dorzolamide-timolol (COSOPT) 2-0.5 % ophthalmic solution Administer 1 Drop to right eye two (2) times a day.           _______________________________    Attestations:  [id number] acting as a scribe for and in the presence of Myrtle Bermudez MD      July 01, 2018 at 11:18 AM       Provider Attestation:      I personally performed the services described in the documentation, reviewed the documentation, as recorded by the scribe in my presence, and it accurately and completely records my words and actions.  July 01, 2018 at 11:18 AM - Myrtle Bermudez MD    _______________________________

## 2018-07-01 NOTE — ROUTINE PROCESS
Primary Nurse Chandrika Simon RN and Porfirio Chavez RN performed a dual skin assessment on this patient No impairment noted  Phillip score is 17

## 2018-07-01 NOTE — Clinical Note
Status[de-identified] Inpatient [101] Type of Bed: Neuro/Stroke [9] Inpatient Hospitalization Certified Necessary for the Following Reasons: 3. Patient receiving treatment that can only be provided in an inpatient setting (further clarification in H&P documentation) Admitting Diagnosis: TIA (transient ischemic attack) [448428] Admitting Physician: Melia Chanel [[id number]] Attending Physician: Melia Chanel [[id number]] Estimated Length of Stay: 2 Midnights Discharge Plan[de-identified] [facility]

## 2018-07-02 ENCOUNTER — APPOINTMENT (OUTPATIENT)
Dept: VASCULAR SURGERY | Age: 83
End: 2018-07-02
Attending: INTERNAL MEDICINE
Payer: MEDICARE

## 2018-07-02 VITALS
BODY MASS INDEX: 17.75 KG/M2 | HEIGHT: 64 IN | TEMPERATURE: 97.8 F | DIASTOLIC BLOOD PRESSURE: 53 MMHG | WEIGHT: 104 LBS | RESPIRATION RATE: 18 BRPM | OXYGEN SATURATION: 98 % | SYSTOLIC BLOOD PRESSURE: 96 MMHG | HEART RATE: 63 BPM

## 2018-07-02 LAB
CHOLEST SERPL-MCNC: 139 MG/DL
EST. AVERAGE GLUCOSE BLD GHB EST-MCNC: NORMAL MG/DL
GLUCOSE BLD STRIP.AUTO-MCNC: 89 MG/DL (ref 70–110)
GLUCOSE BLD STRIP.AUTO-MCNC: 98 MG/DL (ref 70–110)
HBA1C MFR BLD: 4.9 % (ref 4.2–5.6)
HDLC SERPL-MCNC: 28 MG/DL (ref 40–60)
HDLC SERPL: 5 {RATIO} (ref 0–5)
LDLC SERPL CALC-MCNC: 97.8 MG/DL (ref 0–100)
LIPID PROFILE,FLP: ABNORMAL
TRIGL SERPL-MCNC: 66 MG/DL (ref ?–150)
VLDLC SERPL CALC-MCNC: 13.2 MG/DL

## 2018-07-02 PROCEDURE — 36415 COLL VENOUS BLD VENIPUNCTURE: CPT | Performed by: INTERNAL MEDICINE

## 2018-07-02 PROCEDURE — 83036 HEMOGLOBIN GLYCOSYLATED A1C: CPT | Performed by: INTERNAL MEDICINE

## 2018-07-02 PROCEDURE — G8978 MOBILITY CURRENT STATUS: HCPCS

## 2018-07-02 PROCEDURE — 74011250637 HC RX REV CODE- 250/637: Performed by: INTERNAL MEDICINE

## 2018-07-02 PROCEDURE — 96372 THER/PROPH/DIAG INJ SC/IM: CPT

## 2018-07-02 PROCEDURE — G8979 MOBILITY GOAL STATUS: HCPCS

## 2018-07-02 PROCEDURE — 74011250636 HC RX REV CODE- 250/636: Performed by: INTERNAL MEDICINE

## 2018-07-02 PROCEDURE — G8988 SELF CARE GOAL STATUS: HCPCS

## 2018-07-02 PROCEDURE — 97166 OT EVAL MOD COMPLEX 45 MIN: CPT

## 2018-07-02 PROCEDURE — 99218 HC RM OBSERVATION: CPT

## 2018-07-02 PROCEDURE — G8987 SELF CARE CURRENT STATUS: HCPCS

## 2018-07-02 PROCEDURE — 65390000012 HC CONDITION CODE 44 OBSERVATION

## 2018-07-02 PROCEDURE — 93880 EXTRACRANIAL BILAT STUDY: CPT

## 2018-07-02 PROCEDURE — 80061 LIPID PANEL: CPT | Performed by: INTERNAL MEDICINE

## 2018-07-02 PROCEDURE — 97116 GAIT TRAINING THERAPY: CPT

## 2018-07-02 PROCEDURE — G8989 SELF CARE D/C STATUS: HCPCS

## 2018-07-02 PROCEDURE — 82962 GLUCOSE BLOOD TEST: CPT

## 2018-07-02 PROCEDURE — 97161 PT EVAL LOW COMPLEX 20 MIN: CPT

## 2018-07-02 PROCEDURE — 74011000250 HC RX REV CODE- 250: Performed by: INTERNAL MEDICINE

## 2018-07-02 RX ORDER — POTASSIUM CHLORIDE 750 MG/1
20 TABLET, EXTENDED RELEASE ORAL DAILY
Qty: 180 TAB | Refills: 0 | Status: SHIPPED | OUTPATIENT
Start: 2018-07-02

## 2018-07-02 RX ORDER — POTASSIUM CHLORIDE 1.5 G/1.77G
20 POWDER, FOR SOLUTION ORAL
Status: DISCONTINUED | OUTPATIENT
Start: 2018-07-02 | End: 2018-07-02 | Stop reason: HOSPADM

## 2018-07-02 RX ADMIN — LEVOTHYROXINE SODIUM 88 MCG: 88 TABLET ORAL at 09:24

## 2018-07-02 RX ADMIN — POTASSIUM CHLORIDE 20 MEQ: 1.5 POWDER, FOR SOLUTION ORAL at 13:09

## 2018-07-02 RX ADMIN — DORZOLAMIDE HYDROCHLORIDE AND TIMOLOL MALEATE 1 DROP: 20; 5 SOLUTION/ DROPS OPHTHALMIC at 09:27

## 2018-07-02 RX ADMIN — ASPIRIN 325 MG ORAL TABLET 325 MG: 325 PILL ORAL at 09:24

## 2018-07-02 RX ADMIN — HEPARIN SODIUM 5000 UNITS: 5000 INJECTION, SOLUTION INTRAVENOUS; SUBCUTANEOUS at 09:24

## 2018-07-02 RX ADMIN — Medication 10 ML: at 09:28

## 2018-07-02 NOTE — PROGRESS NOTES
PT eval order received and chart reviewed. Unable to see patient at this time as patient off unit at this time. Will follow up as patient schedule allows. Thank you for this referral. Jazmyne Cantu, PT, DPT.

## 2018-07-02 NOTE — PROGRESS NOTES
CMS went to speak with the pt in regards to the \"Patient Guide to Observation & Outpatient Care\" and the [facility] Outpatient Observation Notice. \"  Pt was unable to review and sign the documents provided. Family (granddaughter, and her spouse) were present at bedside and were able to review and sign the documents provided. Signed documents can be found in the chart for review; additional copies were left with the pt family (granddaughter) for review.

## 2018-07-02 NOTE — PROGRESS NOTES
Problem: Mobility Impaired (Adult and Pediatric)  Goal: *Acute Goals and Plan of Care (Insert Text)  Physical Therapy Goals  Initiated 7/2/2018 and to be accomplished within 7 day(s)  1. Patient will move from supine to sit and sit to supine, scoot up and down and roll side to side in bed with supervision/set-up. 2.  Patient will transfer from bed to chair and chair to bed with supervision/set-up using the least restrictive device. 3.  Patient will perform sit to stand with supervision/set-up. 4.  Patient will ambulate with supervision/set-up for >100 feet with the least restrictive device. 5.  Patient will demo good balance for improved safety during functional tasks. physical Therapy EVALUATION    Patient: Sav Johnson (32 y.o. female)  Date: 7/2/2018  Primary Diagnosis: TIA (transient ischemic attack)  TIA (transient ischemic attack)        Precautions:  Fall (Pressure ulcer risk)     ASSESSMENT :  Patient is 81yo F admitted to hospital for TIA symptoms and presents today alert and agreeable to therapy and was supine in bed upon arrival. Patient transferred to EOB with additional time and Lucas and sat for objective assessment. Patient educated on use of RW including transfers and demos fair carryover to push BUE's from bed. Patient then ambulated 100ft with RW at slow pace and SB assist. Patient required cues to keep walker closer to Esther and to keep feet within walker for turns. Patient transferred to sitting EOB then to supine and was left resting with call bell by her side, family in room and bed in chair position. Patient encouraged to continue mobilization with assist and oriented to call bell use; patient acknowledged understanding. Patient demos decreased mobility, strength, and endurance and will benefit from skilled intervention to address the above impairments.   Patients rehabilitation potential is considered to be Good  Factors which may influence rehabilitation potential include:   [] None noted  []         Mental ability/status  [x]         Medical condition  [x]         Home/family situation and support systems  [x]         Safety awareness  []         Pain tolerance/management  []         Other:      PLAN :  Recommendations and Planned Interventions:  [x]           Bed Mobility Training             [x]    Neuromuscular Re-Education  [x]           Transfer Training                   []    Orthotic/Prosthetic Training  [x]           Gait Training                          []    Modalities  [x]           Therapeutic Exercises          []    Edema Management/Control  [x]           Therapeutic Activities            [x]    Patient and Family Training/Education  []           Other (comment):    Frequency/Duration: Patient will be followed by physical therapy 1-2 times per day/4-7 days per week to address goals. Discharge Recommendations: Home Health with 24/7 assit  Further Equipment Recommendations for Discharge: bedside commode, transfer bench and rolling walker     G-CODES     Mobility  Current  CJ= 20-39%   Goal  CI= 1-19%. The severity rating is based on the Level of Assistance required for Functional Mobility and ADLs.        G-CODES     Eval Complexity: History: MEDIUM  Complexity : 1-2 comorbidities / personal factors will impact the outcome/ POC Exam:MEDIUM Complexity : 3 Standardized tests and measures addressing body structure, function, activity limitation and / or participation in recreation  Presentation: LOW Complexity : Stable, uncomplicated  Clinical Decision Making:Low Complexity   Overall Complexity:LOW     SUBJECTIVE:   Patient stated I use my healing oil on my knees and I feel ready to go.     OBJECTIVE DATA SUMMARY:     Past Medical History:   Diagnosis Date    DDD (degenerative disc disease), cervical 1-28-02    Diverticulosis 7-99    by DEVANTE    DJD (degenerative joint disease) of knee 12-29-03    right knee    Fatigue 5-14-04 5-7-04 TSH =1.7, 12-29-06 TSH=2.67, 5-2008 TSH=1.3    Glaucoma 12-99    cataract- s/p left CATS w/ IOL    Hypercholesterolemia     (LDL)    Hypertension     Hypothyroidism     post surg    Osteopenia 2-15-02    SPINE & HIP BY DXA,      VIT D =31 ()    Sacroiliac joint pain 9/2014    Scoliosis 9/2014    Shingles     left forehead    Vitamin D deficiency 8/19/2010     Past Surgical History:   Procedure Laterality Date    HX MOHS PROCEDURES  9-2004    LEFT    VT COLONOSCOPY FLX DX W/COLLJ SPEC WHEN PFRMD       Barriers to Learning/Limitations: None  Compensate with: N/A  Prior Level of Function/Home Situation: Patient lives with daughter in 1 story home with 0STE and reports ambulating with standard walker in her home. Patient has assist for dressing and bathing. Home Situation  Home Environment: Private residence  # Steps to Enter: 1  Rails to Enter: Yes  Hand Rails : Bilateral  Wheelchair Ramp: No  One/Two Story Residence: One story  Living Alone: No  Support Systems: Family member(s) (daughter)  Patient Expects to be Discharged to[de-identified] Private residence  Current DME Used/Available at Home: [employer] [place] or Shower Type: Tub/Shower combination  Critical Behavior:  Neurologic State: Alert;Confused  Orientation Level: Oriented to person;Oriented to place  Cognition: Decreased attention/concentration; Follows commands  Safety/Judgement: Awareness of environment; Fall prevention  Strength:    Strength: Generally decreased, functional   Tone & Sensation:   Tone: Normal   Sensation: Intact   Range Of Motion:  AROM: Generally decreased, functional   Functional Mobility:  Bed Mobility:   Supine to Sit: Minimum assistance  Sit to Supine: Supervision  Scooting: Supervision  Transfers:  Sit to Stand: Minimum assistance  Stand to Sit: Supervision    Balance:   Sitting: Intact; With support  Standing: Impaired; With support  Standing - Static: Good  Standing - Dynamic : Fair  Ambulation/Gait Training:  Distance (ft): 100 Feet (ft)  Assistive Device: Walker, rolling  Ambulation - Level of Assistance: Contact guard assistance   Base of Support: Narrowed  Speed/Caitlin: Slow  Interventions: Safety awareness training;Verbal cues; Visual/Demos            Pain:  Pt reports 0/10 pain or discomfort prior to treatment.    Pt reports 0/10 pain or discomfort post treatment. Activity Tolerance:   Patient tolerated activity well with no dizziness, SOB, or chest pain. Please refer to the flowsheet for vital signs taken during this treatment. After treatment:   []         Patient left in no apparent distress sitting up in chair  [x]         Patient left in no apparent distress in bed  [x]         Call bell left within reach  [x]         Nursing notified (Quiana)  [x]         Caregiver present  [x]         Bed alarm activated  []         SCDs in place to B LE     COMMUNICATION/EDUCATION:   [x]         Fall prevention education was provided and the patient/caregiver indicated understanding. [x]         Patient/family have participated as able in goal setting and plan of care. [x]         Patient/family agree to work toward stated goals and plan of care. []         Patient understands intent and goals of therapy, but is neutral about his/her participation. []         Patient is unable to participate in goal setting and plan of care.     Thank you for this referral.  Jeremy Murillo, PT   Time Calculation: 23 mins

## 2018-07-02 NOTE — PHYSICIAN ADVISORY
Letter of admission status determination - Agree with Observation    Randy Spine   Age: 80 y.o. MRN: [id number]    Date of admission: 7/1/2018    I have reviewed this case as it involves a Medicare patient that was admitted as Inpatient and was subsequently changed to Observation status. Patient's condition and the documented plan of care at the time of presentation do not fully support the need for medically necessary hospitalization for over two midnights. Therefore, unless medically necessary hospitalization for a second midnight is anticipated, I agree with Outpatient OBSERVATION. This may change due to the medical condition of the patient and new clinical evidence as the patients care progreses. The final decision regarding the patient's hospitalization status depends on the attending physician's judgment.       Philip Rodriguez MD, MELIDA, [address] [facility]  Physician [place].  [phone]    July 2, 2018   8:43 AM

## 2018-07-02 NOTE — PROGRESS NOTES
Problem: Self Care Deficits Care Plan (Adult)  Goal: *Acute Goals and Plan of Care (Insert Text)  Occupational Therapy EVALUATION/discharge    Patient: Bernardo Vega (32 y.o. female)  Date: 7/2/2018  Primary Diagnosis: TIA (transient ischemic attack)  TIA (transient ischemic attack)        Precautions:  Fall (Pressure ulcer risk)    ASSESSMENT AND RECOMMENDATIONS:  Based on the objective data described below, the patient presents with deficits in endurance, BUE strength, difficulty with self care ADLs, and functional transfers. Patient is alert and oriented x2 (person, place) but require vcs for time and situation. Therapist provided introduction and purpose of skilled OT evaluation. Patient was receptive and agreeable to eval. Family members (ie daughter and others) present in the room with patient verbal permission. Patient reside in a one level home with 1 REYES w/ bilateral railings. She reside with adult daughter who is primarily in the house with patient. DME in home is rolling walker. Patient's daughter assist with self care ADLs such as dressing. Daughter informed therapist that prior to hospitalization patient was able to have supervised bathing in dc environment and required maximal assistance for dressing. Also the patient was not eating for a maximal of 9 days prior to admission. Both family members and patient agrees patient intake is better since admission. Bed mobility from supine to eob Mod A with minimal handling of left LE. Static seat posture fair with bilateral hand support and no resistance posteriorly. Patient able to have full conversation eob without loss of balance or dizziness. Sit to supine in bed SBA. Call bell and phone within reach at end of session. Skilled occupational therapy is not indicated at this time.   Discharge Recommendations: Rehab/Home Health for home safety evaluation  Further Equipment Recommendations for Discharge: bedside commode, transfer bench      Barriers to Learning/Limitations: yes;  physical and altered mental status (i.e.Sedation, Confusion)  Compensate with: visual, verbal, tactile, kinesthetic cues/model     COMPLEXITY     Eval Complexity: History: LOW Complexity : Brief history review ; Examination: MEDIUM Complexity : 3-5 performance deficits relating to physical, cognitive , or psychosocial skils that result in activity limitations and / or participation restrictions; Decision Making:MEDIUM Complexity : Patient may present with comorbidities that affect occupational performnce. Miniml to moderate modification of tasks or assistance (eg, physical or verbal ) with assesment(s) is necessary to enable patient to complete evaluation  Assessment: Moderate Complexity        G-CODES:     Self Care  Current  CL= 60-79%   Goal  CL= 60-79%   D/C  CL= 60-79%. The severity rating is based on the Level of Assistance required for Functional Mobility and ADLs. SUBJECTIVE:   Patient stated I was doing what the doctor toild me to do, in regards to her eating intake. I ate my breakfast this morning.     OBJECTIVE DATA SUMMARY:     Past Medical History:   Diagnosis Date    DDD (degenerative disc disease), cervical 1-28-02    Diverticulosis 7-99    by BE    DJD (degenerative joint disease) of knee 12-29-03    right knee    Fatigue 5-14-04 5-7-04 TSH =1.7, 12-29-06 TSH=2.67, 5-2008 TSH=1.3    Glaucoma 12-99    cataract- s/p left CATS w/ IOL    Hypercholesterolemia     (LDL)    Hypertension     Hypothyroidism     post surg    Osteopenia 2-15-02    SPINE & HIP BY DXA,      VIT D =31 ()    Sacroiliac joint pain 9/2014    Scoliosis 9/2014    Shingles     left forehead    Vitamin D deficiency 8/19/2010     Past Surgical History:   Procedure Laterality Date    HX MOHS PROCEDURES  9-2004    LEFT    LA COLONOSCOPY FLX DX W/COLLJ SPEC WHEN PFRMD       Prior Level of Function/Home Situation:   Home Situation  Home Environment: Private residence  # Steps to Enter: 1  Rails to Enter: Yes  Hand Rails : Bilateral  Wheelchair Ramp: No  One/Two Story Residence: One story  Living Alone: No  Support Systems: Family member(s) (daughter)  Patient Expects to be Discharged to[de-identified] Private residence  Current DME Used/Available at Home: [employer] or Shower Type: Tub/Shower combination  [x]     Right hand dominant   []     Left hand dominant  Cognitive/Behavioral Status:  Neurologic State: Alert;Confused  Orientation Level: Oriented to person;Oriented to place  Cognition: Decreased attention/concentration; Follows commands  Safety/Judgement: Awareness of environment; Fall prevention    Skin: Intact    Edema: Slight edema in left LE knee region    Vision/Perceptual:    Acuity: Within Defined Limits       Coordination:  Coordination: Generally decreased, functional  Fine Motor Skills-Upper: Left Intact; Right Intact (UEs)    Gross Motor Skills-Upper: Left Intact; Right Intact (UEs)    Balance:  Sitting: Intact    Strength:  Strength: Generally decreased, functional    Tone & Sensation:  Tone: Normal  Sensation: Intact    Range of Motion:  AROM: Generally decreased, functional  PROM: Generally decreased, functional    Functional Mobility and Transfers for ADLs:  Bed Mobility:  Supine to Sit: Moderate assistance  Sit to Supine: Stand-by assistance  Scooting: Stand-by assistance  Transfers:  Non performed on current date due to patient's refusal     ADL Assessment:  Feeding: Minimum assistance (For encouragement)    Oral Facial Hygiene/Grooming: Minimum assistance    Upper Body Dressing: Minimum assistance; Moderate assistance    Lower Body Dressing: Maximum assistance    Toileting: Moderate assistance;Maximum assistance     Cognitive Retraining  Safety/Judgement: Awareness of environment; Fall prevention    Therapeutic Exercise:  None provided on current date    Pain:  Pt reports 3/10 pain or discomfort prior to treatment in left LE knee.    Pt reports 3/10 pain or discomfort post treatment in left LE knee. Activity Tolerance:   Fair- to Poor    Please refer to the flowsheet for vital signs taken during this treatment. After treatment:   []  Patient left in no apparent distress sitting up in chair  [x]  Patient left in no apparent distress in bed  [x]  Call bell left within reach  []  Nursing notified  [x]  Caregiver present (daughter and other family members)  []  Bed alarm activated    COMMUNICATION/EDUCATION:   Communication/Collaboration:  []      Home safety education was provided and the patient/caregiver indicated understanding. [x]      Patient/family have participated as able and agree with findings and recommendations. []      Patient is unable to participate in plan of care at this time.     Alem Mcintosh, OTR/L  Time Calculation: 14 mins

## 2018-07-02 NOTE — INTERDISCIPLINARY ROUNDS
Interdisciplinary STROKE Rounds       Recommendations:     Recommendations are as follows:   1. Discontinue q4 NIHSS on this pt. 2. Provide pt with stroke book and stroke education related to her risk factors. Also provide education to pt and family about medications, uses, and the need to take medications. 3.Pt is on Zocor 20. Per Dr. Bryan Angel, this dosage is appropriate for this patient at her advanced age. He is aware her LDL is 97.8    Stroke Meds currently prescribed: , Zocor 20 mgDVT prophylaxis: heparin sq    Tests ordered:none    Discharge Plan: TBD, PT/OT will see pt today. Discipline Participation:   Interdisciplinary rounds were conducted by:  Dr. Raul Crespo, Neuroscience Medical director,   Siomara Quiñones RN, stroke coordinator  Antwan Valencia ARU liaison,  Quiana SOTO, the patient's nurse. Discussion included patient's current condition, any tests and patient's expected discharge outcome.

## 2018-07-02 NOTE — ROUTINE PROCESS
Bedside and Verbal shift change report given to Noe Villagomez (oncoming nurse) by Gutierrez Benítez (offgoing nurse). Report included the following information SBAR, Kardex, Intake/Output, MAR and Recent Results.

## 2018-07-02 NOTE — DISCHARGE SUMMARY
Discharge Summary     Patient ID:  Ivy Shaw  [id number]  86 y.o.  4/10/1927  Body mass index is 17.85 kg/(m^2). PCP on record: Dell Padilla MD    Admit date: 7/1/2018  Discharge date and time: 7/2/2018    Discharge Diagnoses:                                           1 TIA   2 HTN  3 Anxiety disorder   4 Hypothyroid primary   5 hypokalemia         Consults: Neurology          Hospital Course by problems:    Admitted by right sided facial drooping and right sided weakness , noticed by her caregiver and daughter . Patient was not regular on ASA treatments . CT head and MRI did not show any acute changes . Patient will continue  / day . Neurology consulted . No further testing necessary . Advance age will prevent ant any invasive procedure. Hypokalemia - increase K+ replacement dose to 20 meq / day . Lab slip given . Follow up with PCP     D/w patient's grand daughter / family in room with her     HTN - continue current management     No Clinical evidence of DM2 - glucose readings are acceptable     No Dysphagia. Patient seen and examined by me on discharge day. Pertinent Findings:    Patient is Alert Awake and oriented   HEENT - NAD    RS - Clear , no rales no rhonchi   CVS - regular rhythm and rate acceptable    abd - benign, BS present , no Distension   EXT - no edema , no calf tenderness   Neuro - alert and awake , grossly motor and sensory intact       Significant Diagnostic Studies:  Result Information      Status Provider Status        Final result (Exam End: 7/1/2018  6:20 PM) Open        Study Result      Brain MR without contrast     History: Left-sided facial droop and weakness with drooling, confusion; onset  one hour PTA; subsequently admitted.  Head CT negative.     Comparison: CT separately earlier same date; prior MRI February 2013     Technique: Brain scanned with axial and sagittal T1W scans, axial T2W scans,  axial FLAIR, axial T2*GRE heme sensitive sequence, axial diffusion weighted  images.      Findings:      Mild to moderate cerebral atrophy. Minor amount of chronic white matter disease. Diffusion imaging normal. No acute infarction identified. No hemorrhage  identified. No mass lesion identified.      Ventricles and cisterns are proportional in caliber, remain midline in position. Parasellar and IAC regions are unremarkable. Patent flow-voids of major  skullbase vasculature. Orbits are unremarkable. The paranasal sinuses are  clear. The cervicomedullary junction is unremarkable.           IMPRESSION  IMPRESSION[de-identified]     1. No acute intracranial hemorrhage or territorial infarct. No mass effect. 2. Mild to moderate atrophy and minimal chronic white matter disease as before.     Thank you for this referral.         Pertinent Lab Data:  Recent Labs      07/01/18   1150   WBC  5.4   HGB  10.5*   HCT  32.2*   PLT  124*     Recent Labs      07/01/18   1150   NA  137   K  3.1*   CL  98*   CO2  27   GLU  142*   BUN  15   CREA  1.06   CA  8.7   ALB  2.7*   SGOT  10*   ALT  11*   INR  1.1       DISCHARGE MEDICATIONS:   @  Current Discharge Medication List      CONTINUE these medications which have CHANGED    Details   potassium chloride (KLOR-CON M10) 10 mEq tablet Take 2 Tabs by mouth daily. Indications: hypokalemia  Qty: 180 Tab, Refills: 0      OTHER Cbc   BMP   Magnesium  - send results to VIKTORIYA FREY - patient's PCP  Qty: 1 Act, Refills: 0    Associated Diagnoses: Hypokalemia         CONTINUE these medications which have NOT CHANGED    Details   LORazepam (ATIVAN) 1 mg tablet Take 1 mg by mouth every six (6) hours as needed for Anxiety. Walker misc 1 Units by Does Not Apply route daily. Walker with seat  Qty: 1 Each, Refills: 0      losartan-hydrochlorothiazide (HYZAAR) 100-12.5 mg per tablet Take 1 Tab by mouth daily.   Qty: 90 Tab, Refills: 3    Associated Diagnoses: Essential hypertension, benign      amLODIPine (NORVASC) 5 mg tablet Take 1 Tab by mouth daily. Qty: 90 Tab, Refills: 1      ALPRAZolam (XANAX) 0.25 mg tablet Take 1 Tab by mouth nightly as needed for Anxiety. Max Daily Amount: 0.25 mg.  Qty: 30 Tab, Refills: 1    Associated Diagnoses: Anxiety      levothyroxine (SYNTHROID) 88 mcg tablet Take 1 Tab by mouth Daily (before breakfast). Qty: 90 Tab, Refills: 3    Associated Diagnoses: Hypothyroidism, unspecified hypothyroidism type      simvastatin (ZOCOR) 20 mg tablet Take  by mouth nightly. aspirin (ASPIRIN) 325 mg tablet Take 325 mg by mouth daily. Associated Diagnoses: Other specified transient cerebral ischemias      psyllium seed-sucrose (METAMUCIL) Powd Take 1 Units by mouth two (2) times daily as needed. 1 tsp. in a glass of water b.i.d.prn  Qty: 2 Can, Refills: 3    Associated Diagnoses: Constipation      Cholecalciferol, Vitamin D3, (VITAMIN D) 2,000 unit Cap Take 1 Tab by mouth daily. dorzolamide-timolol (COSOPT) 2-0.5 % ophthalmic solution Administer 1 Drop to right eye two (2) times a day. STOP taking these medications       oxyCODONE-acetaminophen (PERCOCET) 5-325 mg per tablet Comments:   Reason for Stopping:                 My Recommended Diet, Activity, Wound Care, and follow-up labs are listed in the patient's Discharge Insturctions which I have personally completed and reviewed. Disposition:     [] Home with family     [] [facility] PT/RN   [] SNF/NH   [] Inpatient Rehab/TREASURE  Condition at Discharge:  Stable    Follow up with:   PCP : Gina Mora MD      Please follow-up tests/labs that are still pendin.  None  2.    >30 minutes spent coordinating this discharge (review instructions/follow-up, prescriptions, preparing report for sign off)    Signed:  Juan Jernigan MD  2018  10:02 AM

## 2018-07-02 NOTE — DISCHARGE INSTRUCTIONS
Patient armband removed and shredded    MyChart Activation    Thank you for requesting access to [facility] [employer] [facility] [employer]. Please follow the instructions below to securely access and download your online medical record. [facility] [employer] [facility] allows you to send messages to your doctor, view your test results, renew your prescriptions, schedule appointments, and more. How Do I Sign Up? 1. In your internet browser, go to [email or web address]  2. Click on the First Time User? Click Here link in the Sign In box. You will be redirect to the New Member Sign Up page. 3. Enter your [facility] [employer] [facility] [employer] Access Code exactly as it appears below. You will not need to use this code after youve completed the sign-up process. If you do not sign up before the expiration date, you must request a new code. [facility] [employer] [facility] [employer] Access Code: [email or web address]  Expires: 2018  7:35 AM (This is the date your [facility] [employer] [facility] [employer] access code will )    4. Enter the last four digits of your Social Security Number (xxxx) and Date of Birth (mm/dd/yyyy) as indicated and click Submit. You will be taken to the next sign-up page. 5. Create a [facility] ID. This will be your [facility] [employer] [facility] [employer] login ID and cannot be changed, so think of one that is secure and easy to remember. 6. Create a [facility] [employer] [facility] [employer] password. You can change your password at any time. 7. Enter your Password Reset Question and Answer. This can be used at a later time if you forget your password. 8. Enter your e-mail address. You will receive e-mail notification when new information is available in [address] 9. Click Sign Up. You can now view and download portions of your medical record. 10. Click the Download Summary menu link to download a portable copy of your medical information. Additional Information    If you have questions, please visit the Frequently Asked Questions section of the [facility] [employer] [facility] [employer] website at [email or web address] Remember, [facility] [employer] [facility] [employer] is NOT to be used for urgent needs.  For medical emergencies, dial 69 Michelle Baez from Nurse    PATIENT INSTRUCTIONS:    After general anesthesia or intravenous sedation, for 24 hours or while taking prescription Narcotics:  · Limit your activities  · Do not drive and operate hazardous machinery  · Do not make important personal or business decisions  · Do  not drink alcoholic beverages  · If you have not urinated within 8 hours after discharge, please contact your surgeon on call. Report the following to your surgeon:  · Excessive pain, swelling, redness or odor of or around the surgical area  · Temperature over 100.5  · Nausea and vomiting lasting longer than 4 hours or if unable to take medications  · Any signs of decreased circulation or nerve impairment to extremity: change in color, persistent  numbness, tingling, coldness or increase pain  · Any questions    What to do at Home:  Recommended activity: Activity as tolerated,     If you experience any of the following symptoms loss of balance, blurred vision, facial droop, arm numbness or weakness or slurred speech, please follow up with 911. *  Please give a list of your current medications to your Primary Care Provider. *  Please update this list whenever your medications are discontinued, doses are      changed, or new medications (including over-the-counter products) are added. *  Please carry medication information at all times in case of emergency situations. These are general instructions for a healthy lifestyle:    No smoking/ No tobacco products/ Avoid exposure to second hand smoke  Surgeon General's Warning:  Quitting smoking now greatly reduces serious risk to your health.     Obesity, smoking, and sedentary lifestyle greatly increases your risk for illness    A healthy diet, regular physical exercise & weight monitoring are important for maintaining a healthy lifestyle    You may be retaining fluid if you have a history of heart failure or if you experience any of the following symptoms: Weight gain of 3 pounds or more overnight or 5 pounds in a week, increased swelling in our hands or feet or shortness of breath while lying flat in bed. Please call your doctor as soon as you notice any of these symptoms; do not wait until your next office visit. Recognize signs and symptoms of STROKE:    F-face looks uneven    A-arms unable to move or move unevenly    S-speech slurred or non-existent    T-time-call 911 as soon as signs and symptoms begin-DO NOT go       Back to bed or wait to see if you get better-TIME IS BRAIN. Warning Signs of HEART ATTACK     Call 911 if you have these symptoms:   Chest discomfort. Most heart attacks involve discomfort in the center of the chest that lasts more than a few minutes, or that goes away and comes back. It can feel like uncomfortable pressure, squeezing, fullness, or pain.  Discomfort in other areas of the upper body. Symptoms can include pain or discomfort in one or both arms, the back, neck, jaw, or stomach.  Shortness of breath with or without chest discomfort.  Other signs may include breaking out in a cold sweat, nausea, or lightheadedness. Don't wait more than five minutes to call 911 - MINUTES MATTER! Fast action can save your life. Calling 911 is almost always the fastest way to get lifesaving treatment. Emergency Medical Services staff can begin treatment when they arrive -- up to an hour sooner than if someone gets to the hospital by car. The discharge information has been reviewed with the patient and guardian       Transient Ischemic Attack: Care Instructions  Your Care Instructions    A transient ischemic attack (TIA) is when blood flow to a part of your brain is blocked for a short time. A TIA is like a stroke but usually lasts only a few minutes. A TIA does not cause lasting brain damage. Any vision problems, slurred speech, or other symptoms usually go away in 10 to 20 minutes. But they may last for up to 24 hours.   TIAs are often warning signs of a stroke. Some people who have a TIA may have a stroke in the future. A stroke can cause symptoms like those of a TIA. But a stroke causes lasting damage to your brain. You can take steps to help prevent a stroke. One thing you can do is get early treatment. If you have other new symptoms, or if your symptoms do not get better, go back to the emergency room or call your doctor right away. Getting treatment right away may prevent long-term brain damage caused by a stroke. The doctor has checked you carefully, but problems can develop later. If you notice any problems or new symptoms, get medical treatment right away. Follow-up care is a key part of your treatment and safety. Be sure to make and go to all appointments, and call your doctor if you are having problems. It's also a good idea to know your test results and keep a list of the medicines you take. How can you care for yourself at home? Medicines  ? · Be safe with medicines. Take your medicines exactly as prescribed. Call your doctor if you think you are having a problem with your medicine. ? · If you take a blood thinner, such as aspirin, be sure you get instructions about how to take your medicine safely. Blood thinners can cause serious bleeding problems. ? · Call your doctor if you are not able to take your medicines for any reason. ? · Do not take any over-the-counter medicines or herbal products without talking to your doctor first.   ? · If you take birth control pills or hormone therapy, talk to your doctor. Ask if these treatments are right for you. ? Lifestyle changes  ? · Do not smoke. If you need help quitting, talk to your doctor about stop-smoking programs and medicines. ? · Be active. If your doctor recommends it, get more exercise. Walking is a good choice. Bit by bit, increase the amount you walk every day. Try for at least 30 minutes on most days of the week.  You also may want to swim, bike, or do other activities. ? · Eat heart-healthy foods. These include fruits, vegetables, high-fiber foods, fish, and foods that are low in sodium, saturated fat, and trans fat. ? · Stay at a healthy weight. Lose weight if you need to.   ? · Limit alcohol to 2 drinks a day for men and 1 drink a day for women. ?Staying healthy  ? · Manage other health problems such as diabetes, high blood pressure, and high cholesterol. ? · Get the flu vaccine every year. When should you call for help? Call 911 anytime you think you may need emergency care. For example, call if:  ? · You have new or worse symptoms of a stroke. These may include:  ¨ Sudden numbness, tingling, weakness, or loss of movement in your face, arm, or leg, especially on only one side of your body. ¨ Sudden vision changes. ¨ Sudden trouble speaking. ¨ Sudden confusion or trouble understanding simple statements. ¨ Sudden problems with walking or balance. ¨ A sudden, severe headache that is different from past headaches. Call 911 even if these symptoms go away in a few minutes. ? · You feel like you are having another TIA. ? Watch closely for changes in your health, and be sure to contact your doctor if you have any problems. Where can you learn more? Go to http://nita-jamia.info/. Enter [id number] in the search box to learn more about \"Transient Ischemic Attack: Care Instructions. \"  Current as of: March 20, 2017  Content Version: 11.4  © 0521-6773 [employer]. Care instructions adapted under license by [facility] (which disclaims liability or warranty for this information). If you have questions about a medical condition or this instruction, always ask your healthcare professional. [place] [address] any warranty or liability for your use of this information. Hypokalemia: Care Instructions  Your Care Instructions    Hypokalemia (say \"th-wf-bis-SHARLENE-mi-uh\") is a low level of potassium.  The heart, muscles, kidneys, and nervous system all need potassium to work well. This problem has many different causes. Kidney problems, diet, and medicines like diuretics and laxatives can cause it. So can vomiting or diarrhea. In some cases, cancer is the cause. Your doctor may do tests to find the cause of your low potassium levels. You may need medicines to bring your potassium levels back to normal. You may also need regular blood tests to check your potassium. If you have very low potassium, you may need intravenous (IV) medicines. You also may need tests to check the electrical activity of your heart. Heart problems caused by low potassium levels can be very serious. Follow-up care is a key part of your treatment and safety. Be sure to make and go to all appointments, and call your doctor if you are having problems. It's also a good idea to know your test results and keep a list of the medicines you take. How can you care for yourself at home? · If your doctor recommends it, eat foods that have a lot of potassium. These include fresh fruits, juices, and vegetables. They also include nuts, beans, and milk. · Be safe with medicines. If your doctor prescribes medicines or potassium supplements, take them exactly as directed. Call your doctor if you have any problems with your medicines. · Get your potassium levels tested as often as your doctor tells you. When should you call for help? Call 911 anytime you think you may need emergency care. For example, call if:  ? · You feel like your heart is missing beats. Heart problems caused by low potassium can cause death. ? · You passed out (lost consciousness). ? · You have a seizure. ?Call your doctor now or seek immediate medical care if:  ? · You feel weak or unusually tired. ? · You have severe arm or leg cramps. ? · You have tingling or numbness. ? · You feel sick to your stomach, or you vomit. ? · You have belly cramps.    ? · You feel bloated or constipated. ? · You have to urinate a lot. ? · You feel very thirsty most of the time. ? · You are dizzy or lightheaded, or you feel like you may faint. ? · You feel depressed, or you lose touch with reality. ? Watch closely for changes in your health, and be sure to contact your doctor if:  ? · You do not get better as expected. Where can you learn more? Go to http://nita-jamia.info/. Enter G358 in the search box to learn more about \"Hypokalemia: Care Instructions. \"  Current as of: May 12, 2017  Content Version: 11.4  © 3304-3522 [employer]. Care instructions adapted under license by [employer] (which disclaims liability or warranty for this information). If you have questions about a medical condition or this instruction, always ask your healthcare professional. [address] any warranty or liability for your use of this information. .  The patient and guardian verbalized understanding. Discharge medications reviewed with the patient and guardian and appropriate educational materials and side effects teaching were provided.   ___________________________________________________________________________________________________________________________________

## 2018-08-30 ENCOUNTER — DOCUMENTATION ONLY (OUTPATIENT)
Dept: ORTHOPEDIC SURGERY | Age: 83
End: 2018-08-30